# Patient Record
Sex: FEMALE | Race: WHITE | NOT HISPANIC OR LATINO
[De-identification: names, ages, dates, MRNs, and addresses within clinical notes are randomized per-mention and may not be internally consistent; named-entity substitution may affect disease eponyms.]

---

## 2017-02-21 ENCOUNTER — RX RENEWAL (OUTPATIENT)
Age: 82
End: 2017-02-21

## 2017-03-15 ENCOUNTER — APPOINTMENT (OUTPATIENT)
Dept: CARDIOLOGY | Facility: CLINIC | Age: 82
End: 2017-03-15

## 2017-03-22 ENCOUNTER — MEDICATION RENEWAL (OUTPATIENT)
Age: 82
End: 2017-03-22

## 2017-04-19 ENCOUNTER — APPOINTMENT (OUTPATIENT)
Dept: CARDIOLOGY | Facility: CLINIC | Age: 82
End: 2017-04-19

## 2017-04-19 ENCOUNTER — NON-APPOINTMENT (OUTPATIENT)
Age: 82
End: 2017-04-19

## 2017-04-19 VITALS
WEIGHT: 162 LBS | OXYGEN SATURATION: 95 % | HEIGHT: 62 IN | DIASTOLIC BLOOD PRESSURE: 76 MMHG | BODY MASS INDEX: 29.81 KG/M2 | SYSTOLIC BLOOD PRESSURE: 127 MMHG | HEART RATE: 73 BPM

## 2017-05-23 ENCOUNTER — APPOINTMENT (OUTPATIENT)
Dept: CARDIOLOGY | Facility: CLINIC | Age: 82
End: 2017-05-23

## 2017-05-31 ENCOUNTER — APPOINTMENT (OUTPATIENT)
Dept: CARDIOLOGY | Facility: CLINIC | Age: 82
End: 2017-05-31

## 2017-12-13 ENCOUNTER — MEDICATION RENEWAL (OUTPATIENT)
Age: 82
End: 2017-12-13

## 2018-03-21 ENCOUNTER — RX RENEWAL (OUTPATIENT)
Age: 83
End: 2018-03-21

## 2018-03-28 ENCOUNTER — MEDICATION RENEWAL (OUTPATIENT)
Age: 83
End: 2018-03-28

## 2019-01-15 ENCOUNTER — RX RENEWAL (OUTPATIENT)
Age: 84
End: 2019-01-15

## 2019-01-19 ENCOUNTER — RX RENEWAL (OUTPATIENT)
Age: 84
End: 2019-01-19

## 2019-03-20 ENCOUNTER — APPOINTMENT (OUTPATIENT)
Dept: CARDIOLOGY | Facility: CLINIC | Age: 84
End: 2019-03-20
Payer: MEDICARE

## 2019-03-20 ENCOUNTER — NON-APPOINTMENT (OUTPATIENT)
Age: 84
End: 2019-03-20

## 2019-03-20 VITALS
SYSTOLIC BLOOD PRESSURE: 154 MMHG | DIASTOLIC BLOOD PRESSURE: 83 MMHG | WEIGHT: 160 LBS | BODY MASS INDEX: 29.44 KG/M2 | HEIGHT: 62 IN | OXYGEN SATURATION: 97 % | HEART RATE: 90 BPM

## 2019-03-20 DIAGNOSIS — I65.23 OCCLUSION AND STENOSIS OF BILATERAL CAROTID ARTERIES: ICD-10-CM

## 2019-03-20 DIAGNOSIS — I35.1 NONRHEUMATIC AORTIC (VALVE) INSUFFICIENCY: ICD-10-CM

## 2019-03-20 PROCEDURE — 93000 ELECTROCARDIOGRAM COMPLETE: CPT

## 2019-03-20 PROCEDURE — 99215 OFFICE O/P EST HI 40 MIN: CPT

## 2019-03-20 RX ORDER — AMLODIPINE BESYLATE 2.5 MG/1
2.5 TABLET ORAL DAILY
Qty: 90 | Refills: 1 | Status: ACTIVE | COMMUNITY

## 2019-03-20 NOTE — DISCUSSION/SUMMARY
[FreeTextEntry1] : Mrs. Jamil has been doing well from a cardiac symptomatic standpoint since her previous visit here on 4/19/17. Specifically, she does not describe having experienced any signs or symptoms to suggest the development of an anginal syndrome, congestive heart failure, or a hemodynamically-compromising arrhythmia. Her cardiac examination today is remarkable for a soft systolic ejection murmur, in all likelihood representing age-related valvular calcification, and essentially unchanged from her previous visit with me. Her blood pressure reading is normal today. Her electrocardiogram today reveals sinus rhythm with non-specific poor R wave progression in leads V1-V4, essentially unchanged from her previous office tracing.\par \par As the patient's blood pressure readings at the office of her hematologist when she goes for her weekly Aranesp injections have been running in the normal range, as well as the patient exhibiting a normal blood pressure reading today, I have instructed her to continue on the present dosage of Norvasc for the time being. She was previously being treated with losartan, however, this medication was discontinued by her primary care provider, secondary to the development of hyperkalemia.\par \par The patient anticipates having follow-up blood testing performed through the office of her internist in July of 2019, and I have asked her to have a copy of the report forwarded to my office for my review and records, so that I may assess the efficacy of the present dosage of Zocor in optimizing the lipid profile, as well as to check for any potential adverse effects on the liver.\par \par I am referring the patient for a follow-up echocardiography at this point to reassess the degree of mitral regurgitation and the degree of aortic regurgitation. Follow-up carotid artery Doppler testing will be performed to reassess the degree of atherosclerosis formation demonstrated on the previous study. In view of the finding of significant coronary artery calcifications on the chest CT scan 3/5/19, I am referring the patient for exercise stress testing to evaluate for the presence of ischemic heart disease. The patient is going to make arrangements to have these studies performed through our office, and I will telephone her to discuss the findings, once the studies have been completed.\par \par I have asked the patient to call me if she should have any questions or problems pertaining to these matters, and especially if she should experience any concerning symptoms. I have otherwise asked her to return to the office for follow-up cardiac evaluation in 6 months, provided she remains clinically stable in the interim, and the findings on the aforementioned cardiovascular studies do not warrant sooner evaluation and/or treatment.

## 2019-03-20 NOTE — PHYSICAL EXAM
[General Appearance - In No Acute Distress] : no acute distress [Normal Conjunctiva] : the conjunctiva exhibited no abnormalities [No Oral Pallor] : no oral pallor [Respiration, Rhythm And Depth] : normal respiratory rhythm and effort [Auscultation Breath Sounds / Voice Sounds] : lungs were clear to auscultation bilaterally [Bowel Sounds] : normal bowel sounds [Abdomen Tenderness] : non-tender [Abnormal Walk] : normal gait [Cyanosis, Localized] : no localized cyanosis [Oriented To Time, Place, And Person] : oriented to person, place, and time [] : no rash [Not Palpable] : not palpable [No Precordial Heave] : no precordial heave was noted [Normal Rate] : normal [Normal S1] : normal S1 [Rhythm Regular] : regular [No Gallop] : no gallop heard [Normal S2] : normal S2 [Carotids] : the murmur was transmitted to the carotid arteries [I] : a grade 1 [2+] : left 2+ [No Abnormalities] : the abdominal aorta was not enlarged and no bruit was heard [No Pitting Edema] : no pitting edema present [Apical Thrill] : no thrill palpable at the apex [FreeTextEntry1] : no significant JVD is appreciated at a 45° angle [Click] : no click [Right Carotid Bruit] : no bruit heard over the right carotid [Pericardial Rub] : no pericardial rub [Left Carotid Bruit] : no bruit heard over the left carotid

## 2019-03-20 NOTE — HISTORY OF PRESENT ILLNESS
[FreeTextEntry1] : Mrs. Abbie Jamil presented to the office today for follow-up cardiac evaluation.\par \par The patient is an 83-year-old female with a history of hypertension, a dyslipidemia, mild mitral regurgitation, mild aortic regurgitation, coronary artery calcifications (incidentally discovered on a chest CT scan), mild carotid atherosclerosis, renal insufficiency, chronic anemia, a thyroid nodule, a pulmonary nodule, a pancreatic cyst, breast carcinoma, and vertigo.\par \par The patient has been doing well from a cardiac symptomatic standpoint since her previous visit here on 4/19/17. Specifically, she does not describe having experienced chest discomfort or dyspnea on exertion, in association with her activities. She has not noted orthopnea, paroxysmal nocturnal dyspnea, or lower extremity edema. She has not experienced any episodes of palpitations, presyncope or syncope.\par \par The patient reports having been having her blood pressure readings checked on a weekly basis when she presents to the office of her hematologist, Dr. Roma Marcial, for Aranesp injections, and describes the readings as having been consistently running well within the normal range.\par \par Review of systems is significant for the patient having undergone multiple biopsies of a thyroid nodule, performed by the patient's endocrinologist, Dr. Nicole Allison. In addition, she states that  she has been following with her hematologist/oncologist regarding having high iron stores, although she states that she has not been definitively diagnosed as having hemachromatosis.\par \par As far as risk factors for coronary artery disease are concerned, the patient has a history of hypertension and a dyslipidemia. She denies a history of diabetes. She discontinued cigarette smoking in 1986, having previously smoked an average of 1-1/2 to 2 packs per day for a period of approximately 30 years. She describes having a family history of premature coronary artery disease in her father, stating that he suffered his first "heart attack" while in his 50's (ultimately passing at the age of 60 secondary to stomach carcinoma).\par \par Past medical/surgical history is significant for left breast carcinoma having been diagnosed in 2009, treated with lumpectomy, followed by a 35 week course of radiation therapy, followed by a 5 year course of tamoxifen (she has been undergoing surveillance by her oncologist, Dr. Roma Marcial on a regular basis since that time), chronic anemia of undetermined etiology (she has been receiving Aranesp injections fthrough the office of Dr. Marcial, presently every week), a pulmonary nodule (for which the patient follows regularly with a pulmonologist, including having periodic surveillance CT scanning of the chest performed), a pancreatic cyst (incidentally discovered on a chest CT scan, and deemed to be stable, according to the patient), and vertigo.\par \par Echocardiography performed most recently on 5/31/17 revealed the left atrium to be moderately dilated. The remainder of the cardiac chambers were normal in dimension. Left ventricular wall thickness and wall motion were normal, with a calculated ejection fraction of 63%. Mild aortic regurgitation, mild mitral regurgitation, and mild tricuspid regurgitation were demonstrated, without evidence of pulmonary hypertension.\par \par Carotid artery Doppler testing performed most recently on 5/23/17 revealed mild plaque formation involving the bulbar regions bilaterally and mild to moderate plaque formation involving the proximal portions of the internal carotid arteries bilaterally. No significant stenoses were demonstrated. The internal carotid arteries were noted to be tortuous. The left thyroid gland was enlarged with a 2.2 cm mass, which is noted to be of similar echogenicity to the thyroid gland.\par \par Laboratory studies (non-fasting) performed through the office of the patient's endocrinologist on 1/31/17 revealed cholesterol 217, triglycerides 253, HDL 44, and direct . The thyroid stimulating hormone level was 2.25. The liver chemistries were normal. The BUN and creatinine were 51 and 1.5, respectively. The potassium level was 5.2.

## 2019-03-20 NOTE — REASON FOR VISIT
[Hyperlipidemia] : hyperlipidemia [Hypertension] : hypertension [FreeTextEntry1] : coronary artery calcifications

## 2019-03-26 ENCOUNTER — EMERGENCY (EMERGENCY)
Facility: HOSPITAL | Age: 84
LOS: 1 days | Discharge: HOME | End: 2019-03-26
Attending: EMERGENCY MEDICINE | Admitting: EMERGENCY MEDICINE
Payer: MEDICARE

## 2019-03-26 VITALS
WEIGHT: 160.94 LBS | RESPIRATION RATE: 18 BRPM | OXYGEN SATURATION: 97 % | HEIGHT: 62 IN | HEART RATE: 116 BPM | SYSTOLIC BLOOD PRESSURE: 169 MMHG | DIASTOLIC BLOOD PRESSURE: 63 MMHG | TEMPERATURE: 98 F

## 2019-03-26 VITALS
DIASTOLIC BLOOD PRESSURE: 54 MMHG | RESPIRATION RATE: 16 BRPM | TEMPERATURE: 98 F | SYSTOLIC BLOOD PRESSURE: 167 MMHG | HEART RATE: 100 BPM | OXYGEN SATURATION: 95 %

## 2019-03-26 LAB
ABO RH CONFIRMATION: SIGNIFICANT CHANGE UP
ALBUMIN SERPL ELPH-MCNC: 3.6 G/DL — SIGNIFICANT CHANGE UP (ref 3.3–5)
ALP SERPL-CCNC: 73 U/L — SIGNIFICANT CHANGE UP (ref 30–120)
ALT FLD-CCNC: 29 U/L DA — SIGNIFICANT CHANGE UP (ref 10–60)
ANION GAP SERPL CALC-SCNC: 13 MMOL/L — SIGNIFICANT CHANGE UP (ref 5–17)
APTT BLD: 30.2 SEC — SIGNIFICANT CHANGE UP (ref 28.5–37)
AST SERPL-CCNC: 21 U/L — SIGNIFICANT CHANGE UP (ref 10–40)
BASOPHILS # BLD AUTO: 0.03 K/UL — SIGNIFICANT CHANGE UP (ref 0–0.2)
BASOPHILS NFR BLD AUTO: 0.4 % — SIGNIFICANT CHANGE UP (ref 0–2)
BILIRUB SERPL-MCNC: 1.2 MG/DL — SIGNIFICANT CHANGE UP (ref 0.2–1.2)
BLD GP AB SCN SERPL QL: SIGNIFICANT CHANGE UP
BUN SERPL-MCNC: 48 MG/DL — HIGH (ref 7–23)
CALCIUM SERPL-MCNC: 9.6 MG/DL — SIGNIFICANT CHANGE UP (ref 8.4–10.5)
CHLORIDE SERPL-SCNC: 112 MMOL/L — HIGH (ref 96–108)
CO2 SERPL-SCNC: 22 MMOL/L — SIGNIFICANT CHANGE UP (ref 22–31)
CREAT SERPL-MCNC: 1.52 MG/DL — HIGH (ref 0.5–1.3)
EOSINOPHIL # BLD AUTO: 0.08 K/UL — SIGNIFICANT CHANGE UP (ref 0–0.5)
EOSINOPHIL NFR BLD AUTO: 1 % — SIGNIFICANT CHANGE UP (ref 0–6)
GLUCOSE SERPL-MCNC: 120 MG/DL — HIGH (ref 70–99)
HCT VFR BLD CALC: 23.6 % — LOW (ref 34.5–45)
HGB BLD-MCNC: 6.9 G/DL — CRITICAL LOW (ref 11.5–15.5)
IMM GRANULOCYTES NFR BLD AUTO: 0.4 % — SIGNIFICANT CHANGE UP (ref 0–1.5)
INR BLD: 1.12 RATIO — SIGNIFICANT CHANGE UP (ref 0.88–1.16)
LYMPHOCYTES # BLD AUTO: 1.45 K/UL — SIGNIFICANT CHANGE UP (ref 1–3.3)
LYMPHOCYTES # BLD AUTO: 18.4 % — SIGNIFICANT CHANGE UP (ref 13–44)
MCHC RBC-ENTMCNC: 23.5 PG — LOW (ref 27–34)
MCHC RBC-ENTMCNC: 29.2 GM/DL — LOW (ref 32–36)
MCV RBC AUTO: 80.3 FL — SIGNIFICANT CHANGE UP (ref 80–100)
MONOCYTES # BLD AUTO: 0.76 K/UL — SIGNIFICANT CHANGE UP (ref 0–0.9)
MONOCYTES NFR BLD AUTO: 9.7 % — SIGNIFICANT CHANGE UP (ref 2–14)
NEUTROPHILS # BLD AUTO: 5.52 K/UL — SIGNIFICANT CHANGE UP (ref 1.8–7.4)
NEUTROPHILS NFR BLD AUTO: 70.1 % — SIGNIFICANT CHANGE UP (ref 43–77)
NRBC # BLD: 0 /100 WBCS — SIGNIFICANT CHANGE UP (ref 0–0)
PLATELET # BLD AUTO: 215 K/UL — SIGNIFICANT CHANGE UP (ref 150–400)
POTASSIUM SERPL-MCNC: 4.7 MMOL/L — SIGNIFICANT CHANGE UP (ref 3.5–5.3)
POTASSIUM SERPL-SCNC: 4.7 MMOL/L — SIGNIFICANT CHANGE UP (ref 3.5–5.3)
PROT SERPL-MCNC: 7.1 G/DL — SIGNIFICANT CHANGE UP (ref 6–8.3)
PROTHROM AB SERPL-ACNC: 12.3 SEC — SIGNIFICANT CHANGE UP (ref 10–12.9)
RBC # BLD: 2.94 M/UL — LOW (ref 3.8–5.2)
RBC # FLD: 30.8 % — HIGH (ref 10.3–14.5)
SODIUM SERPL-SCNC: 147 MMOL/L — HIGH (ref 135–145)
WBC # BLD: 7.87 K/UL — SIGNIFICANT CHANGE UP (ref 3.8–10.5)
WBC # FLD AUTO: 7.87 K/UL — SIGNIFICANT CHANGE UP (ref 3.8–10.5)

## 2019-03-26 PROCEDURE — 80053 COMPREHEN METABOLIC PANEL: CPT

## 2019-03-26 PROCEDURE — 85610 PROTHROMBIN TIME: CPT

## 2019-03-26 PROCEDURE — 85027 COMPLETE CBC AUTOMATED: CPT

## 2019-03-26 PROCEDURE — 86900 BLOOD TYPING SEROLOGIC ABO: CPT

## 2019-03-26 PROCEDURE — 99284 EMERGENCY DEPT VISIT MOD MDM: CPT

## 2019-03-26 PROCEDURE — 85730 THROMBOPLASTIN TIME PARTIAL: CPT

## 2019-03-26 PROCEDURE — 36415 COLL VENOUS BLD VENIPUNCTURE: CPT

## 2019-03-26 PROCEDURE — P9016: CPT

## 2019-03-26 PROCEDURE — 99285 EMERGENCY DEPT VISIT HI MDM: CPT | Mod: 25

## 2019-03-26 PROCEDURE — 86923 COMPATIBILITY TEST ELECTRIC: CPT

## 2019-03-26 PROCEDURE — 86901 BLOOD TYPING SEROLOGIC RH(D): CPT

## 2019-03-26 PROCEDURE — 86850 RBC ANTIBODY SCREEN: CPT

## 2019-03-26 PROCEDURE — 36430 TRANSFUSION BLD/BLD COMPNT: CPT

## 2019-03-26 NOTE — ED PROVIDER NOTE - OBJECTIVE STATEMENT
Pt is an 82 y/o F, with PMHx of anemia, breast CA, MDS, and HLD, presenting to the ED with abnormal lab results. Pt states she was receiving routine injections, had a CBC today showing her Hgb was 6.9, and pt sent to the ED for further management. Pt states she has been intemrittently SOB. Denies fever, CP, abd pain, N/V, melena, BRBPR, hematemesis, HA, and dizziness. Has never required transfusions. Pt believes her last Hgb prior to today was 7+.   Alyssa- PMMICHELLE Marcial- Hematology Pt is an 82 y/o F, with PMHx of anemia, breast CA, MDS, and HLD, presenting to the ED with abnormal lab results. Pt states she was receiving routine injections, had a CBC today showing her Hgb was 6.9, and pt sent to the ED for 2 units PRBC's. Pt states she has been intemrittently SOB. Denies fever, CP, abd pain, N/V, melena, BRBPR, hematemesis, HA, and dizziness. Has never required transfusions. Pt believes her last Hgb prior to today was 7+.   Alyssa- PMMICHELLE Marcial- Hematology

## 2019-03-26 NOTE — ED ADULT NURSE REASSESSMENT NOTE - NS ED NURSE REASSESS COMMENT FT1
received report and assumed care of pt at change of shift. pt awake and alert. denies pain/discomfort. family at bedside. second unit PRBC's unit #[id number] infusing at this time. no transfusion reaction seen. pt taking and tolerating PO food and fluids. will continue to monitor. cm+sinus

## 2019-03-26 NOTE — ED PROVIDER NOTE - CLINICAL SUMMARY MEDICAL DECISION MAKING FREE TEXT BOX
Pt with chronic anemia secondary to MDS, referred  by her hematologist for two units PRBC for hgb 6.9.

## 2019-03-26 NOTE — ED ADULT NURSE NOTE - ED STAT RN HANDOFF DETAILS
Awaiting blood availability. IV patent. Consent obtained for transfusion.  Comfortable appearance. VSS. Endorsed to Timothy Lui RN

## 2019-03-26 NOTE — ED ADULT NURSE NOTE - OBJECTIVE STATEMENT
Sent by her Hem/onc Dr. Marcial to receive blood transfusions, Sent by her Hem/onc Dr. Marcial to receive blood transfusions, Patient has had anemia for "a while" but states her most recent count was very low at 6.9. Patient also states dyspnea on exertion and recent airplane flight.

## 2019-03-26 NOTE — ED ADULT NURSE REASSESSMENT NOTE - NS ED NURSE REASSESS COMMENT FT1
blood transfusion completed. pt OOB to BRP. NAD presently. see transfusion administration record for vs. d/c to family. cm=ST

## 2019-03-28 ENCOUNTER — APPOINTMENT (OUTPATIENT)
Dept: CARDIOLOGY | Facility: CLINIC | Age: 84
End: 2019-03-28
Payer: MEDICARE

## 2019-04-03 ENCOUNTER — APPOINTMENT (OUTPATIENT)
Dept: CARDIOLOGY | Facility: CLINIC | Age: 84
End: 2019-04-03
Payer: MEDICARE

## 2019-04-03 PROCEDURE — 93306 TTE W/DOPPLER COMPLETE: CPT

## 2019-04-04 ENCOUNTER — APPOINTMENT (OUTPATIENT)
Dept: CARDIOLOGY | Facility: CLINIC | Age: 84
End: 2019-04-04
Payer: MEDICARE

## 2019-04-04 PROCEDURE — 93880 EXTRACRANIAL BILAT STUDY: CPT

## 2019-04-17 PROBLEM — D64.9 ANEMIA, UNSPECIFIED: Chronic | Status: ACTIVE | Noted: 2019-03-26

## 2019-04-23 ENCOUNTER — OTHER (OUTPATIENT)
Age: 84
End: 2019-04-23

## 2019-04-23 ENCOUNTER — APPOINTMENT (OUTPATIENT)
Dept: SURGERY | Facility: CLINIC | Age: 84
End: 2019-04-23
Payer: MEDICARE

## 2019-04-23 VITALS
BODY MASS INDEX: 27.6 KG/M2 | WEIGHT: 150 LBS | DIASTOLIC BLOOD PRESSURE: 71 MMHG | HEIGHT: 62 IN | SYSTOLIC BLOOD PRESSURE: 117 MMHG | HEART RATE: 71 BPM

## 2019-04-23 PROCEDURE — 36415 COLL VENOUS BLD VENIPUNCTURE: CPT

## 2019-04-23 PROCEDURE — 99204 OFFICE O/P NEW MOD 45 MIN: CPT

## 2019-04-24 LAB
T3 SERPL-MCNC: 182 NG/DL
T4 FREE SERPL-MCNC: 2.5 NG/DL
TSH SERPL-ACNC: 0.01 UIU/ML

## 2019-04-25 ENCOUNTER — APPOINTMENT (OUTPATIENT)
Dept: CARDIOLOGY | Facility: CLINIC | Age: 84
End: 2019-04-25

## 2019-04-25 LAB
THYROGLOB AB SERPL-ACNC: <20 IU/ML
THYROPEROXIDASE AB SERPL IA-ACNC: 88.9 IU/ML

## 2019-04-26 NOTE — REASON FOR VISIT
[Initial Consultation] : an initial consultation for [Spouse] : spouse [FreeTextEntry2] : toxic goiter [Family Member] : family member

## 2019-04-26 NOTE — HISTORY OF PRESENT ILLNESS
[de-identified] : history of toxic goiter with tremor, 15 lb weight loss and shortness of breath. denies dysphagia, hoarseness or RT exposure. scan shows toxic left nodule.  sonogram shows 2.5 cm left thyroid nodule. FNA: thyroiditis.  suppressed TSH

## 2019-04-26 NOTE — PHYSICAL EXAM
[de-identified] : substernal left thyroid nodule [Laryngoscopy Performed] : laryngoscopy was performed, see procedure section for findings [Midline] : located in midline position [Normal] : orientation to person, place, and time: normal [FreeTextEntry1] : resting pulse 64 [de-identified] : indirect  laryngoscopy shows normal vocal cord mobility bilaterally with no lesions noted

## 2019-04-26 NOTE — CONSULT LETTER
[Consult Letter:] : I had the pleasure of evaluating your patient, [unfilled]. [Dear  ___] : Dear  [unfilled], [Please see my note below.] : Please see my note below. [Consult Closing:] : Thank you very much for allowing me to participate in the care of this patient.  If you have any questions, please do not hesitate to contact me. [Sincerely,] : Sincerely, [FreeTextEntry2] : Dr. Nicole Allison, Dr. Ge Hayes [FreeTextEntry3] : Kash Melendez MD, FACS\par System Director, Endocrine Surgery\par [facility]\par  [DrQiana  ___] : Dr. MARES

## 2019-04-26 NOTE — ASSESSMENT
[FreeTextEntry1] : discussed options for management including antithyroid medications vs SCOTT vs thyroid lobectomy. risks, benefits and alternatives discussed at length. wishes to proceed with surgery. to be scheduled at [facility].  bloods drawn. to call next week for results. d/w dr Allison who concurs with performing thyroid lobectomy

## 2019-04-30 ENCOUNTER — EMERGENCY (EMERGENCY)
Facility: HOSPITAL | Age: 84
LOS: 1 days | Discharge: HOME | End: 2019-04-30
Attending: EMERGENCY MEDICINE | Admitting: EMERGENCY MEDICINE
Payer: MEDICARE

## 2019-04-30 VITALS
TEMPERATURE: 98 F | SYSTOLIC BLOOD PRESSURE: 151 MMHG | OXYGEN SATURATION: 97 % | DIASTOLIC BLOOD PRESSURE: 66 MMHG | WEIGHT: 149.91 LBS | HEART RATE: 70 BPM | RESPIRATION RATE: 18 BRPM | HEIGHT: 62 IN

## 2019-04-30 LAB
ALBUMIN SERPL ELPH-MCNC: 3.6 G/DL — SIGNIFICANT CHANGE UP (ref 3.3–5)
ALP SERPL-CCNC: 61 U/L — SIGNIFICANT CHANGE UP (ref 30–120)
ALT FLD-CCNC: 25 U/L DA — SIGNIFICANT CHANGE UP (ref 10–60)
ANION GAP SERPL CALC-SCNC: 13 MMOL/L — SIGNIFICANT CHANGE UP (ref 5–17)
AST SERPL-CCNC: 18 U/L — SIGNIFICANT CHANGE UP (ref 10–40)
BILIRUB SERPL-MCNC: 0.6 MG/DL — SIGNIFICANT CHANGE UP (ref 0.2–1.2)
BUN SERPL-MCNC: 66 MG/DL — HIGH (ref 7–23)
CALCIUM SERPL-MCNC: 9.8 MG/DL — SIGNIFICANT CHANGE UP (ref 8.4–10.5)
CHLORIDE SERPL-SCNC: 112 MMOL/L — HIGH (ref 96–108)
CO2 SERPL-SCNC: 19 MMOL/L — LOW (ref 22–31)
CREAT SERPL-MCNC: 1.83 MG/DL — HIGH (ref 0.5–1.3)
GLUCOSE SERPL-MCNC: 119 MG/DL — HIGH (ref 70–99)
HCT VFR BLD CALC: 22.7 % — LOW (ref 34.5–45)
HGB BLD-MCNC: 7 G/DL — CRITICAL LOW (ref 11.5–15.5)
MCHC RBC-ENTMCNC: 27.1 PG — SIGNIFICANT CHANGE UP (ref 27–34)
MCHC RBC-ENTMCNC: 30.8 GM/DL — LOW (ref 32–36)
MCV RBC AUTO: 88 FL — SIGNIFICANT CHANGE UP (ref 80–100)
NRBC # BLD: 0 /100 WBCS — SIGNIFICANT CHANGE UP (ref 0–0)
PLATELET # BLD AUTO: 191 K/UL — SIGNIFICANT CHANGE UP (ref 150–400)
POTASSIUM SERPL-MCNC: 4.6 MMOL/L — SIGNIFICANT CHANGE UP (ref 3.5–5.3)
POTASSIUM SERPL-SCNC: 4.6 MMOL/L — SIGNIFICANT CHANGE UP (ref 3.5–5.3)
PROT SERPL-MCNC: 6.6 G/DL — SIGNIFICANT CHANGE UP (ref 6–8.3)
RBC # BLD: 2.58 M/UL — LOW (ref 3.8–5.2)
RBC # FLD: 26.7 % — HIGH (ref 10.3–14.5)
SODIUM SERPL-SCNC: 144 MMOL/L — SIGNIFICANT CHANGE UP (ref 135–145)
WBC # BLD: 6.56 K/UL — SIGNIFICANT CHANGE UP (ref 3.8–10.5)
WBC # FLD AUTO: 6.56 K/UL — SIGNIFICANT CHANGE UP (ref 3.8–10.5)

## 2019-04-30 PROCEDURE — 96361 HYDRATE IV INFUSION ADD-ON: CPT

## 2019-04-30 PROCEDURE — P9016: CPT

## 2019-04-30 PROCEDURE — 86900 BLOOD TYPING SEROLOGIC ABO: CPT

## 2019-04-30 PROCEDURE — 99285 EMERGENCY DEPT VISIT HI MDM: CPT | Mod: 25

## 2019-04-30 PROCEDURE — 36430 TRANSFUSION BLD/BLD COMPNT: CPT

## 2019-04-30 PROCEDURE — 86901 BLOOD TYPING SEROLOGIC RH(D): CPT

## 2019-04-30 PROCEDURE — 99285 EMERGENCY DEPT VISIT HI MDM: CPT

## 2019-04-30 PROCEDURE — 80053 COMPREHEN METABOLIC PANEL: CPT

## 2019-04-30 PROCEDURE — 85027 COMPLETE CBC AUTOMATED: CPT

## 2019-04-30 PROCEDURE — 86850 RBC ANTIBODY SCREEN: CPT

## 2019-04-30 PROCEDURE — 86923 COMPATIBILITY TEST ELECTRIC: CPT

## 2019-04-30 PROCEDURE — 36415 COLL VENOUS BLD VENIPUNCTURE: CPT

## 2019-04-30 PROCEDURE — 96360 HYDRATION IV INFUSION INIT: CPT

## 2019-04-30 RX ORDER — SODIUM CHLORIDE 9 MG/ML
1000 INJECTION INTRAMUSCULAR; INTRAVENOUS; SUBCUTANEOUS ONCE
Qty: 0 | Refills: 0 | Status: COMPLETED | OUTPATIENT
Start: 2019-04-30 | End: 2019-04-30

## 2019-04-30 RX ADMIN — SODIUM CHLORIDE 333.33 MILLILITER(S): 9 INJECTION INTRAMUSCULAR; INTRAVENOUS; SUBCUTANEOUS at 21:30

## 2019-04-30 NOTE — ED ADULT NURSE REASSESSMENT NOTE - NS ED NURSE REASSESS COMMENT FT1
RBC's infusing, pt is awake and alert, denies any symptoms of a reaction at this time. Pt ate a turkey sandwich and is drinking water. Daughter remains at bedside with the pt.

## 2019-04-30 NOTE — ED PROVIDER NOTE - ATTENDING CONTRIBUTION TO CARE
hx as per pt and PA, 84yo female who presents for transfusion today. pt had blood work and was told her Hb is 7, no sob, no dizziness, no other compalints  exam:+pale conjunctiva, normal lungs, abd soft, neuro intact  plan: labs, transfuse and d/c home  agree with assessment and plan of PA

## 2019-04-30 NOTE — ED PROVIDER NOTE - CLINICAL SUMMARY MEDICAL DECISION MAKING FREE TEXT BOX
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

## 2019-04-30 NOTE — ED ADULT NURSE NOTE - OBJECTIVE STATEMENT
Pt states she received a call from her oncologist after having labs drawn that she was anemic and required a blood transfusion. Pt is ambulatory, gait is strong and steady. Pt denies any dizziness, weakness, or lightheadedness. Pt denies any obvious signs of bleeding, states she is usually anemic and receives injections but missed her scheduled dose in March.

## 2019-04-30 NOTE — ED PROVIDER NOTE - OBJECTIVE STATEMENT
84 y/o F with hx of sideroblastic anemia, MDS, HLD, breast ca here for blood transfusion today. Pt states that she is scheduled for L thyroidectomy on 5/13 by Dr. Melendez, had blood work 84 y/o F with hx of sideroblastic anemia, MDS, HLD, breast ca here for blood transfusion today. Pt states that she is scheduled for L thyroidectomy on 5/13 by Dr. Melendez, had blood work done today which showed Hgb/hct of 7.1/23.3 and sent to ED for transfusion and then to be discharged. States that she gets weeks shots of aranesp. Pt denies any symptoms at this time. Denies dizziness, rectal bleeding, SOB or other symptoms.   PMD: Dr. Shah 82 y/o F with hx of sideroblastic anemia, MDS, HLD, breast ca here for blood transfusion today. Pt states that she is scheduled for L thyroidectomy on 5/13 by Dr. Melendez, had blood work done today which showed Hgb/hct of 7.1/23.3 and sent to ED for transfusion and then to be discharged. States that she gets weeks shots of aranesp. States that she has had a poor appetite and not eating/drinking much lately. Pt denies any symptoms at this time. Denies dizziness, rectal bleeding, SOB or other symptoms.   PMD: Dr. Shah

## 2019-04-30 NOTE — ED PROVIDER NOTE - CONSTITUTIONAL, MLM
normal... appears slightly pale, well nourished, awake, alert, oriented to person, place, time/situation and in no apparent distress.

## 2019-05-01 VITALS
HEART RATE: 67 BPM | SYSTOLIC BLOOD PRESSURE: 141 MMHG | DIASTOLIC BLOOD PRESSURE: 46 MMHG | RESPIRATION RATE: 18 BRPM | OXYGEN SATURATION: 98 % | TEMPERATURE: 98 F

## 2019-05-01 RX ADMIN — SODIUM CHLORIDE 1000 MILLILITER(S): 9 INJECTION INTRAMUSCULAR; INTRAVENOUS; SUBCUTANEOUS at 00:30

## 2019-05-01 NOTE — ED ADULT NURSE REASSESSMENT NOTE - NS ED NURSE REASSESS COMMENT FT1
PRBC completed, pt feels well, appears more energetic. Pt ambulated to the BR, gait is steady. Pt declines repeat lab draw stating she will see her endocrinologist in the AM and go by her recommendation.

## 2019-05-02 ENCOUNTER — RESULT REVIEW (OUTPATIENT)
Age: 84
End: 2019-05-02

## 2019-05-02 ENCOUNTER — APPOINTMENT (OUTPATIENT)
Dept: CARDIOLOGY | Facility: CLINIC | Age: 84
End: 2019-05-02
Payer: MEDICARE

## 2019-05-02 ENCOUNTER — OTHER (OUTPATIENT)
Age: 84
End: 2019-05-02

## 2019-05-02 PROCEDURE — 78452 HT MUSCLE IMAGE SPECT MULT: CPT

## 2019-05-02 PROCEDURE — 93015 CV STRESS TEST SUPVJ I&R: CPT

## 2019-05-02 PROCEDURE — A9500: CPT

## 2019-05-03 ENCOUNTER — OUTPATIENT (OUTPATIENT)
Dept: OUTPATIENT SERVICES | Facility: HOSPITAL | Age: 84
LOS: 1 days | End: 2019-05-03
Payer: MEDICARE

## 2019-05-03 VITALS
HEART RATE: 63 BPM | WEIGHT: 151.02 LBS | RESPIRATION RATE: 16 BRPM | HEIGHT: 63 IN | DIASTOLIC BLOOD PRESSURE: 56 MMHG | SYSTOLIC BLOOD PRESSURE: 146 MMHG | TEMPERATURE: 98 F

## 2019-05-03 DIAGNOSIS — E04.1 NONTOXIC SINGLE THYROID NODULE: ICD-10-CM

## 2019-05-03 DIAGNOSIS — T78.40XA ALLERGY, UNSPECIFIED, INITIAL ENCOUNTER: ICD-10-CM

## 2019-05-03 DIAGNOSIS — D64.9 ANEMIA, UNSPECIFIED: ICD-10-CM

## 2019-05-03 DIAGNOSIS — C50.919 MALIGNANT NEOPLASM OF UNSPECIFIED SITE OF UNSPECIFIED FEMALE BREAST: ICD-10-CM

## 2019-05-03 LAB
ANION GAP SERPL CALC-SCNC: 11 MMO/L — SIGNIFICANT CHANGE UP (ref 7–14)
BUN SERPL-MCNC: 41 MG/DL — HIGH (ref 7–23)
CALCIUM SERPL-MCNC: 10 MG/DL — SIGNIFICANT CHANGE UP (ref 8.4–10.5)
CHLORIDE SERPL-SCNC: 114 MMOL/L — HIGH (ref 98–107)
CO2 SERPL-SCNC: 17 MMOL/L — LOW (ref 22–31)
CREAT SERPL-MCNC: 1.37 MG/DL — HIGH (ref 0.5–1.3)
GLUCOSE SERPL-MCNC: 86 MG/DL — SIGNIFICANT CHANGE UP (ref 70–99)
HCT VFR BLD CALC: 25.7 % — LOW (ref 34.5–45)
HGB BLD-MCNC: 7.7 G/DL — LOW (ref 11.5–15.5)
MCHC RBC-ENTMCNC: 27.3 PG — SIGNIFICANT CHANGE UP (ref 27–34)
MCHC RBC-ENTMCNC: 30 % — LOW (ref 32–36)
MCV RBC AUTO: 91.1 FL — SIGNIFICANT CHANGE UP (ref 80–100)
NRBC # FLD: 0 K/UL — SIGNIFICANT CHANGE UP (ref 0–0)
PLATELET # BLD AUTO: 187 K/UL — SIGNIFICANT CHANGE UP (ref 150–400)
PMV BLD: SIGNIFICANT CHANGE UP FL (ref 7–13)
POTASSIUM SERPL-MCNC: 4.7 MMOL/L — SIGNIFICANT CHANGE UP (ref 3.5–5.3)
POTASSIUM SERPL-SCNC: 4.7 MMOL/L — SIGNIFICANT CHANGE UP (ref 3.5–5.3)
RBC # BLD: 2.82 M/UL — LOW (ref 3.8–5.2)
RBC # FLD: 24.9 % — HIGH (ref 10.3–14.5)
SODIUM SERPL-SCNC: 142 MMOL/L — SIGNIFICANT CHANGE UP (ref 135–145)
T3 SERPL-MCNC: 189.1 NG/DL — SIGNIFICANT CHANGE UP (ref 80–200)
T4 FREE SERPL-MCNC: 2.45 NG/DL — HIGH (ref 0.9–1.8)
TSH SERPL-MCNC: < 0.1 UIU/ML — LOW (ref 0.27–4.2)
WBC # BLD: 6.15 K/UL — SIGNIFICANT CHANGE UP (ref 3.8–10.5)
WBC # FLD AUTO: 6.15 K/UL — SIGNIFICANT CHANGE UP (ref 3.8–10.5)

## 2019-05-03 PROCEDURE — 93010 ELECTROCARDIOGRAM REPORT: CPT

## 2019-05-03 RX ORDER — ATENOLOL 25 MG/1
1 TABLET ORAL
Qty: 0 | Refills: 0 | COMMUNITY

## 2019-05-03 RX ORDER — METHIMAZOLE 10 MG/1
1 TABLET ORAL
Qty: 0 | Refills: 0 | COMMUNITY

## 2019-05-03 NOTE — H&P PST ADULT - NSICDXPROBLEM_GEN_ALL_CORE_FT
PROBLEM DIAGNOSES  Problem: Nontoxic thyroid nodule  Assessment and Plan: Left Thyroid Lobectomy Possible Total  Pre op instructions including Hibiclens reviewed with pt ; pt appears to have a good understanding of pre op instructions  Pt to Dr Vick for pre op cardiac clearance ; echo [ 3/19] and ST  [4/19] to be faxed with cardiac clearance     Problem: Anemia  Assessment and Plan: Pt f/u with Dr Marcial every Tuesday ; request most recent consult     Problem: Breast cancer  Assessment and Plan: No Blood, BP, IV Left Arm     Problem: Allergy  Assessment and Plan: PCN, Codeine  OR Booking notified via fax

## 2019-05-03 NOTE — H&P PST ADULT - NSICDXFAMILYHX_GEN_ALL_CORE_FT
FAMILY HISTORY:  FH: breast cancer, mother  FH: colon cancer, brother  FH: coronary artery disease, father, brother

## 2019-05-03 NOTE — H&P PST ADULT - HISTORY OF PRESENT ILLNESS
Pt is an 83 y.o. female ; pt reports h/o hyperthyroidism dx 4 weeks ago ; pt reports h/o left thyroid nodule ; pt f/u Dr Valdez ; pt referred to surgeon ; pt now presents for Left Thyroid Lobectomy possible Total    Pt reports recent 10 lb weight loss, Pt is an 83 y.o. female ;pt reports h/o thyroid nodule ; monitored by endocrinologist ; pt reports h/o hyperthyroidism dx 4 weeks ago ; pt f/u Dr Allison ; pt referred to surgeon ; pt now presents for Left Thyroid Lobectomy possible Total    Pt reports recent 10 lb weight loss,

## 2019-05-03 NOTE — H&P PST ADULT - NSICDXPASTMEDICALHX_GEN_ALL_CORE_FT
PAST MEDICAL HISTORY:  Anemia     Breast Cancer Left Breast  tx surgery , rt and tamoxifen    Gout 2018    H/O hyperthyroidism     Hypercholesterolemia     Hyperlipidemia     MDS (myelodysplastic syndrome)     Vertigo last summer 2018

## 2019-05-03 NOTE — H&P PST ADULT - OTHER CARE PROVIDERS
Dr Vick cardiologist                        Dr Escalona Dr Vick cardiologist               Dr Allison endocrinologist

## 2019-05-03 NOTE — H&P PST ADULT - NSANTHOSAYNRD_GEN_A_CORE
No. NONA screening performed.  STOP BANG Legend: 0-2 = LOW Risk; 3-4 = INTERMEDIATE Risk; 5-8 = HIGH Risk

## 2019-05-03 NOTE — H&P PST ADULT - PRIMARY CARE PROVIDER
Dr Hayes pcp                                 Dr Roma Marcial Dr Hayes pcp   [phone]                        Dr Roma Marcial  hematologist

## 2019-05-04 PROBLEM — M10.9 GOUT, UNSPECIFIED: Chronic | Status: ACTIVE | Noted: 2019-05-03

## 2019-05-04 PROBLEM — R42 DIZZINESS AND GIDDINESS: Chronic | Status: ACTIVE | Noted: 2019-05-03

## 2019-05-04 PROBLEM — D46.9 MYELODYSPLASTIC SYNDROME, UNSPECIFIED: Chronic | Status: ACTIVE | Noted: 2019-03-26

## 2019-05-07 ENCOUNTER — RESULT REVIEW (OUTPATIENT)
Age: 84
End: 2019-05-07

## 2019-05-07 ENCOUNTER — LABORATORY RESULT (OUTPATIENT)
Age: 84
End: 2019-05-07

## 2019-05-07 LAB
T3 SERPL-MCNC: 176 NG/DL
T4 FREE SERPL-MCNC: 2.4 NG/DL
THYROGLOB AB SERPL-ACNC: <20 IU/ML
THYROPEROXIDASE AB SERPL IA-ACNC: 68.3 IU/ML
TSH SERPL-ACNC: <0.01 UIU/ML

## 2019-05-10 ENCOUNTER — EMERGENCY (EMERGENCY)
Facility: HOSPITAL | Age: 84
LOS: 1 days | Discharge: HOME | End: 2019-05-10
Attending: EMERGENCY MEDICINE | Admitting: EMERGENCY MEDICINE
Payer: MEDICARE

## 2019-05-10 VITALS
RESPIRATION RATE: 14 BRPM | HEIGHT: 62 IN | DIASTOLIC BLOOD PRESSURE: 56 MMHG | WEIGHT: 149.91 LBS | TEMPERATURE: 98 F | OXYGEN SATURATION: 99 % | HEART RATE: 60 BPM | SYSTOLIC BLOOD PRESSURE: 111 MMHG

## 2019-05-10 VITALS
DIASTOLIC BLOOD PRESSURE: 48 MMHG | TEMPERATURE: 98 F | RESPIRATION RATE: 20 BRPM | SYSTOLIC BLOOD PRESSURE: 143 MMHG | HEART RATE: 68 BPM | OXYGEN SATURATION: 96 %

## 2019-05-10 LAB
ALBUMIN SERPL ELPH-MCNC: 3.4 G/DL — SIGNIFICANT CHANGE UP (ref 3.3–5)
ALLERGY+IMMUNOLOGY DIAG STUDY NOTE: SIGNIFICANT CHANGE UP
ALP SERPL-CCNC: 57 U/L — SIGNIFICANT CHANGE UP (ref 30–120)
ALT FLD-CCNC: 23 U/L DA — SIGNIFICANT CHANGE UP (ref 10–60)
ANION GAP SERPL CALC-SCNC: 9 MMOL/L — SIGNIFICANT CHANGE UP (ref 5–17)
ANISOCYTOSIS BLD QL: SLIGHT — SIGNIFICANT CHANGE UP
APTT BLD: 26.5 SEC — LOW (ref 28.5–37)
AST SERPL-CCNC: 17 U/L — SIGNIFICANT CHANGE UP (ref 10–40)
BASOPHILS # BLD AUTO: 0.03 K/UL — SIGNIFICANT CHANGE UP (ref 0–0.2)
BASOPHILS NFR BLD AUTO: 0.5 % — SIGNIFICANT CHANGE UP (ref 0–2)
BILIRUB SERPL-MCNC: 0.9 MG/DL — SIGNIFICANT CHANGE UP (ref 0.2–1.2)
BUN SERPL-MCNC: 41 MG/DL — HIGH (ref 7–23)
CALCIUM SERPL-MCNC: 9.7 MG/DL — SIGNIFICANT CHANGE UP (ref 8.4–10.5)
CHLORIDE SERPL-SCNC: 113 MMOL/L — HIGH (ref 96–108)
CO2 SERPL-SCNC: 23 MMOL/L — SIGNIFICANT CHANGE UP (ref 22–31)
CREAT SERPL-MCNC: 1.71 MG/DL — HIGH (ref 0.5–1.3)
ELLIPTOCYTES BLD QL SMEAR: SLIGHT — SIGNIFICANT CHANGE UP
EOSINOPHIL # BLD AUTO: 0.1 K/UL — SIGNIFICANT CHANGE UP (ref 0–0.5)
EOSINOPHIL NFR BLD AUTO: 1.8 % — SIGNIFICANT CHANGE UP (ref 0–6)
GLUCOSE SERPL-MCNC: 107 MG/DL — HIGH (ref 70–99)
HCT VFR BLD CALC: 22 % — LOW (ref 34.5–45)
HGB BLD-MCNC: 7 G/DL — CRITICAL LOW (ref 11.5–15.5)
HYPOCHROMIA BLD QL: SLIGHT — SIGNIFICANT CHANGE UP
IMM GRANULOCYTES NFR BLD AUTO: 0.2 % — SIGNIFICANT CHANGE UP (ref 0–1.5)
INR BLD: 1.2 RATIO — HIGH (ref 0.88–1.16)
LYMPHOCYTES # BLD AUTO: 0.96 K/UL — LOW (ref 1–3.3)
LYMPHOCYTES # BLD AUTO: 16.8 % — SIGNIFICANT CHANGE UP (ref 13–44)
MACROCYTES BLD QL: SLIGHT — SIGNIFICANT CHANGE UP
MANUAL SMEAR VERIFICATION: SIGNIFICANT CHANGE UP
MCHC RBC-ENTMCNC: 29 PG — SIGNIFICANT CHANGE UP (ref 27–34)
MCHC RBC-ENTMCNC: 31.8 GM/DL — LOW (ref 32–36)
MCV RBC AUTO: 91.3 FL — SIGNIFICANT CHANGE UP (ref 80–100)
MONOCYTES # BLD AUTO: 0.85 K/UL — SIGNIFICANT CHANGE UP (ref 0–0.9)
MONOCYTES NFR BLD AUTO: 14.9 % — HIGH (ref 2–14)
NEUTROPHILS # BLD AUTO: 3.76 K/UL — SIGNIFICANT CHANGE UP (ref 1.8–7.4)
NEUTROPHILS NFR BLD AUTO: 65.8 % — SIGNIFICANT CHANGE UP (ref 43–77)
NRBC # BLD: 0 /100 WBCS — SIGNIFICANT CHANGE UP (ref 0–0)
OVALOCYTES BLD QL SMEAR: SLIGHT — SIGNIFICANT CHANGE UP
PLAT MORPH BLD: NORMAL — SIGNIFICANT CHANGE UP
PLATELET # BLD AUTO: 164 K/UL — SIGNIFICANT CHANGE UP (ref 150–400)
POIKILOCYTOSIS BLD QL AUTO: SLIGHT — SIGNIFICANT CHANGE UP
POTASSIUM SERPL-MCNC: 4.6 MMOL/L — SIGNIFICANT CHANGE UP (ref 3.5–5.3)
POTASSIUM SERPL-SCNC: 4.6 MMOL/L — SIGNIFICANT CHANGE UP (ref 3.5–5.3)
PROT SERPL-MCNC: 6.4 G/DL — SIGNIFICANT CHANGE UP (ref 6–8.3)
PROTHROM AB SERPL-ACNC: 13.1 SEC — HIGH (ref 10–12.9)
RBC # BLD: 2.41 M/UL — LOW (ref 3.8–5.2)
RBC # FLD: 24.2 % — HIGH (ref 10.3–14.5)
RBC BLD AUTO: ABNORMAL
SODIUM SERPL-SCNC: 145 MMOL/L — SIGNIFICANT CHANGE UP (ref 135–145)
WBC # BLD: 5.71 K/UL — SIGNIFICANT CHANGE UP (ref 3.8–10.5)
WBC # FLD AUTO: 5.71 K/UL — SIGNIFICANT CHANGE UP (ref 3.8–10.5)

## 2019-05-10 PROCEDURE — 86901 BLOOD TYPING SEROLOGIC RH(D): CPT

## 2019-05-10 PROCEDURE — P9016: CPT

## 2019-05-10 PROCEDURE — 99284 EMERGENCY DEPT VISIT MOD MDM: CPT

## 2019-05-10 PROCEDURE — 86900 BLOOD TYPING SEROLOGIC ABO: CPT

## 2019-05-10 PROCEDURE — 85610 PROTHROMBIN TIME: CPT

## 2019-05-10 PROCEDURE — 86923 COMPATIBILITY TEST ELECTRIC: CPT

## 2019-05-10 PROCEDURE — 85027 COMPLETE CBC AUTOMATED: CPT

## 2019-05-10 PROCEDURE — 85730 THROMBOPLASTIN TIME PARTIAL: CPT

## 2019-05-10 PROCEDURE — 86850 RBC ANTIBODY SCREEN: CPT

## 2019-05-10 PROCEDURE — 36415 COLL VENOUS BLD VENIPUNCTURE: CPT

## 2019-05-10 PROCEDURE — 99285 EMERGENCY DEPT VISIT HI MDM: CPT | Mod: 25

## 2019-05-10 PROCEDURE — 36430 TRANSFUSION BLD/BLD COMPNT: CPT

## 2019-05-10 PROCEDURE — 80053 COMPREHEN METABOLIC PANEL: CPT

## 2019-05-10 NOTE — ED ADULT NURSE NOTE - OBJECTIVE STATEMENT
Pt states she was sent to ED for blood transfusion by hem/onc as she needs it prior to thyroid sx on monday.  Pt denies cp, sob, n/v or any other complaints.  daughter at bedside.

## 2019-05-10 NOTE — ED PROVIDER NOTE - CARE PROVIDER_API CALL
Roma Marcial)  Hematology; Internal Medicine  [address], [address]  [place] [address]  Phone: [phone]  Fax: [phone]  Follow Up Time: 1-3 Days

## 2019-05-10 NOTE — ED ADULT NURSE REASSESSMENT NOTE - NS ED NURSE REASSESS COMMENT FT1
Second unit PRBCs infusing, pt tolerating well.  See transfusion administration record for VS.  daughter at bedside.
Tolerating first unit PRBC well.  Daughter at bedside.
Pt tolerated second PRBC unit well.  See VS charting on blood administration record.  daughter at bedside.

## 2019-05-10 NOTE — ED PROVIDER NOTE - PMH
Anemia    Breast Cancer  Left Breast  tx surgery , rt and tamoxifen  Gout  2018  H/O hyperthyroidism    Hypercholesterolemia    Hyperlipidemia    MDS (myelodysplastic syndrome)    Vertigo  last summer 2018

## 2019-05-10 NOTE — ED ADULT NURSE NOTE - NSIMPLEMENTINTERV_GEN_ALL_ED
Implemented All Universal Safety Interventions:  [place] to call system. Call bell, personal items and telephone within reach. Instruct patient to call for assistance. Room bathroom lighting operational. Non-slip footwear when patient is off stretcher. Physically safe environment: no spills, clutter or unnecessary equipment. Stretcher in lowest position, wheels locked, appropriate side rails in place.

## 2019-05-12 ENCOUNTER — TRANSCRIPTION ENCOUNTER (OUTPATIENT)
Age: 84
End: 2019-05-12

## 2019-05-12 PROBLEM — Z86.39 PERSONAL HISTORY OF OTHER ENDOCRINE, NUTRITIONAL AND METABOLIC DISEASE: Chronic | Status: ACTIVE | Noted: 2019-05-03

## 2019-05-13 ENCOUNTER — OUTPATIENT (OUTPATIENT)
Dept: OUTPATIENT SERVICES | Facility: HOSPITAL | Age: 84
LOS: 1 days | Discharge: HOME | End: 2019-05-13
Payer: MEDICARE

## 2019-05-13 ENCOUNTER — OTHER (OUTPATIENT)
Age: 84
End: 2019-05-13

## 2019-05-13 ENCOUNTER — RESULT REVIEW (OUTPATIENT)
Age: 84
End: 2019-05-13

## 2019-05-13 ENCOUNTER — APPOINTMENT (OUTPATIENT)
Dept: SURGERY | Facility: HOSPITAL | Age: 84
End: 2019-05-13

## 2019-05-13 VITALS
RESPIRATION RATE: 16 BRPM | SYSTOLIC BLOOD PRESSURE: 157 MMHG | DIASTOLIC BLOOD PRESSURE: 51 MMHG | HEIGHT: 63 IN | WEIGHT: 151.02 LBS | TEMPERATURE: 98 F | OXYGEN SATURATION: 95 % | HEART RATE: 65 BPM

## 2019-05-13 VITALS
SYSTOLIC BLOOD PRESSURE: 126 MMHG | DIASTOLIC BLOOD PRESSURE: 72 MMHG | OXYGEN SATURATION: 98 % | HEART RATE: 58 BPM | RESPIRATION RATE: 17 BRPM

## 2019-05-13 DIAGNOSIS — E04.1 NONTOXIC SINGLE THYROID NODULE: ICD-10-CM

## 2019-05-13 LAB
HCT VFR BLD CALC: 33.7 % — LOW (ref 34.5–45)
HCT VFR BLDV CALC: 34.4 % — LOW (ref 34.5–45)
HGB BLD-MCNC: 10.6 G/DL — LOW (ref 11.5–15.5)
MCHC RBC-ENTMCNC: 28.8 PG — SIGNIFICANT CHANGE UP (ref 27–34)
MCHC RBC-ENTMCNC: 31.5 % — LOW (ref 32–36)
MCV RBC AUTO: 91.6 FL — SIGNIFICANT CHANGE UP (ref 80–100)
NRBC # FLD: 0 K/UL — SIGNIFICANT CHANGE UP (ref 0–0)
PLATELET # BLD AUTO: 158 K/UL — SIGNIFICANT CHANGE UP (ref 150–400)
PMV BLD: SIGNIFICANT CHANGE UP FL (ref 7–13)
RBC # BLD: 3.68 M/UL — LOW (ref 3.8–5.2)
RBC # FLD: 19.2 % — HIGH (ref 10.3–14.5)
WBC # BLD: 8.64 K/UL — SIGNIFICANT CHANGE UP (ref 3.8–10.5)
WBC # FLD AUTO: 8.64 K/UL — SIGNIFICANT CHANGE UP (ref 3.8–10.5)

## 2019-05-13 PROCEDURE — 88307 TISSUE EXAM BY PATHOLOGIST: CPT | Mod: 26

## 2019-05-13 PROCEDURE — 60220 PARTIAL REMOVAL OF THYROID: CPT | Mod: AS

## 2019-05-13 PROCEDURE — 13132 CMPLX RPR F/C/C/M/N/AX/G/H/F: CPT | Mod: 59

## 2019-05-13 PROCEDURE — 60220 PARTIAL REMOVAL OF THYROID: CPT

## 2019-05-13 RX ORDER — ACETAMINOPHEN 500 MG
2 TABLET ORAL
Qty: 0 | Refills: 0 | DISCHARGE
Start: 2019-05-13

## 2019-05-13 RX ORDER — METHIMAZOLE 10 MG/1
0 TABLET ORAL
Qty: 0 | Refills: 0 | DISCHARGE

## 2019-05-13 RX ORDER — ACETAMINOPHEN 500 MG
650 TABLET ORAL EVERY 6 HOURS
Refills: 0 | Status: DISCONTINUED | OUTPATIENT
Start: 2019-05-13 | End: 2019-05-28

## 2019-05-13 RX ORDER — SODIUM CHLORIDE 9 MG/ML
1000 INJECTION, SOLUTION INTRAVENOUS
Refills: 0 | Status: DISCONTINUED | OUTPATIENT
Start: 2019-05-13 | End: 2019-05-28

## 2019-05-13 NOTE — ASU DISCHARGE PLAN (ADULT/PEDIATRIC) - PLEASE INDICATE TEMPERATURE IN FAHRENHEIT OR CELSIUS
[facility] [address] Patient Status:  Outpatient   Age/Gender 11 month old male MRN [id number]   Location [address] Attending Chasity Reeder MD   Hosp Day # 0 PCP Radha Valera MD       Anesthesia Post-op Note    * No procedure
Mother reports pt has had upper abdominal pain since Sunday. Pt has seen PCP for the same earlier this week and put on pepcid. Mother states the Prilosec is not helping. Reports nausea, but denies vomiting.
100.1

## 2019-05-13 NOTE — ASU DISCHARGE PLAN (ADULT/PEDIATRIC) - CALL YOUR DOCTOR IF YOU HAVE ANY OF THE FOLLOWING:
Swelling that gets worse/Bleeding that does not stop/Pain not relieved by Medications/Nausea and vomiting that does not stop/Numbness, tingling, color or temperature change to extremity Wound/Surgical Site with redness, or foul smelling discharge or pus/Numbness, tingling, color or temperature change to extremity/Bleeding that does not stop/Swelling that gets worse/Pain not relieved by Medications/Unable to urinate/Inability to tolerate liquids or foods/Fever greater than (need to indicate Fahrenheit or Celsius)/Nausea and vomiting that does not stop

## 2019-05-13 NOTE — ASU DISCHARGE PLAN (ADULT/PEDIATRIC) - FOLLOW UP APPOINTMENTS
911 or go to the nearest Emergency Room [facility]: [facility] (Adult):/may also call Recovery Room (PACU) 24/7 @ [phone]

## 2019-05-13 NOTE — ASU DISCHARGE PLAN (ADULT/PEDIATRIC) - CARE PROVIDER_API CALL
Kash Melendez)  Plastic Surgery; Surgery  [address], [address]  [place] [address]  Phone: [phone]  Fax: [phone]  Follow Up Time:

## 2019-05-13 NOTE — ASU DISCHARGE PLAN (ADULT/PEDIATRIC) - NURSING INSTRUCTIONS
******************************************************  You were given intravenous TYLENOL for pain management at 10:30 AM. Please DO NOT take any products containing TYLENOL or ACETAMINOPHEN, such as VICODIN, PERCOCET, EXCEDRIN, and any over-the-counter cold medication for the next 6 hours (until 4:30 PM). DO NOT TAKE MORE THAN 3000 MG OF TYLENOL in a 24 hour period.   ****************************************************** ******************************************************  You were given intravenous TYLENOL for pain management at 10:30 AM. Please DO NOT take any products containing TYLENOL or ACETAMINOPHEN, such as VICODIN, PERCOCET, EXCEDRIN, and any over-the-counter cold medication for the next 6 hours (until 4:30 PM). DO NOT TAKE MORE THAN 3000 MG OF TYLENOL in a 24 hour period.   ******************************************************    Call MD for any neck swelling, any shortness of breath, or any redness /drainage from wound. Stay away from hot, spicy and jagged edged foods.  Call MD for any nasal tip, fingertip or extremity numbness/tingling.

## 2019-05-13 NOTE — ASU PREOP CHECKLIST - WARM FLUIDS/WARM BLANKETS
----- Message from ÁNGEL Mercedes sent at 5/9/2019 11:49 AM EDT -----  Please inform patient her pap smear returned negative (normal). It did show bacterial vaginosis as expected and the flagyl prescribed last visit was the appropriate therapy. Thank you.     no

## 2019-05-14 ENCOUNTER — APPOINTMENT (OUTPATIENT)
Dept: SURGERY | Facility: CLINIC | Age: 84
End: 2019-05-14
Payer: MEDICARE

## 2019-05-14 ENCOUNTER — APPOINTMENT (OUTPATIENT)
Dept: SURGERY | Facility: CLINIC | Age: 84
End: 2019-05-14

## 2019-05-14 PROCEDURE — 99024 POSTOP FOLLOW-UP VISIT: CPT

## 2019-05-14 NOTE — PHYSICAL EXAM
[de-identified] : Minimal postop swelling [Midline] : located in midline position [Normal] : orientation to person, place, and time: normal

## 2019-05-14 NOTE — ASSESSMENT
[FreeTextEntry1] : s/p Thyroid lobectomy\par daily care\par drain removed\par call 1 week for path report\par f/u Dr Allison as scheduled\par f/u 4 weeks

## 2019-05-16 LAB — SURGICAL PATHOLOGY STUDY: SIGNIFICANT CHANGE UP

## 2019-05-20 ENCOUNTER — OTHER (OUTPATIENT)
Age: 84
End: 2019-05-20

## 2019-06-13 ENCOUNTER — APPOINTMENT (OUTPATIENT)
Dept: SURGERY | Facility: CLINIC | Age: 84
End: 2019-06-13
Payer: MEDICARE

## 2019-06-13 PROCEDURE — 99024 POSTOP FOLLOW-UP VISIT: CPT

## 2019-06-13 PROCEDURE — 36415 COLL VENOUS BLD VENIPUNCTURE: CPT

## 2019-06-13 NOTE — HISTORY OF PRESENT ILLNESS
[de-identified] : Pt 6 weeks s/p Thyroid lobectomy for NIFTP doing well but not feeling 100%. Pt has appointment with Dr Allison in 2 weeks

## 2019-06-14 LAB
T3 SERPL-MCNC: 155 NG/DL
T4 FREE SERPL-MCNC: 1.6 NG/DL
TSH SERPL-ACNC: 0.01 UIU/ML

## 2019-06-17 ENCOUNTER — RESULT REVIEW (OUTPATIENT)
Age: 84
End: 2019-06-17

## 2019-06-17 LAB
THYROGLOB AB SERPL-ACNC: 29.1 IU/ML
THYROPEROXIDASE AB SERPL IA-ACNC: 93 IU/ML

## 2019-06-20 ENCOUNTER — RESULT REVIEW (OUTPATIENT)
Age: 84
End: 2019-06-20

## 2019-06-24 ENCOUNTER — OTHER (OUTPATIENT)
Age: 84
End: 2019-06-24

## 2019-06-29 ENCOUNTER — EMERGENCY (EMERGENCY)
Facility: HOSPITAL | Age: 84
LOS: 1 days | Discharge: HOME | End: 2019-06-29
Attending: EMERGENCY MEDICINE | Admitting: EMERGENCY MEDICINE
Payer: MEDICARE

## 2019-06-29 VITALS
SYSTOLIC BLOOD PRESSURE: 134 MMHG | WEIGHT: 139.99 LBS | RESPIRATION RATE: 17 BRPM | HEIGHT: 62 IN | HEART RATE: 60 BPM | OXYGEN SATURATION: 97 % | DIASTOLIC BLOOD PRESSURE: 60 MMHG | TEMPERATURE: 97 F

## 2019-06-29 VITALS
HEART RATE: 59 BPM | TEMPERATURE: 98 F | DIASTOLIC BLOOD PRESSURE: 48 MMHG | OXYGEN SATURATION: 98 % | RESPIRATION RATE: 20 BRPM | SYSTOLIC BLOOD PRESSURE: 123 MMHG

## 2019-06-29 LAB
ALBUMIN SERPL ELPH-MCNC: 3.4 G/DL — SIGNIFICANT CHANGE UP (ref 3.3–5)
ALP SERPL-CCNC: 67 U/L — SIGNIFICANT CHANGE UP (ref 30–120)
ALT FLD-CCNC: 27 U/L DA — SIGNIFICANT CHANGE UP (ref 10–60)
ANION GAP SERPL CALC-SCNC: 8 MMOL/L — SIGNIFICANT CHANGE UP (ref 5–17)
ANISOCYTOSIS BLD QL: SLIGHT — SIGNIFICANT CHANGE UP
APPEARANCE UR: CLEAR — SIGNIFICANT CHANGE UP
APTT BLD: 27.4 SEC — LOW (ref 28.5–37)
AST SERPL-CCNC: 17 U/L — SIGNIFICANT CHANGE UP (ref 10–40)
BACTERIA # UR AUTO: ABNORMAL
BASOPHILS # BLD AUTO: 0.06 K/UL — SIGNIFICANT CHANGE UP (ref 0–0.2)
BASOPHILS NFR BLD AUTO: 0.6 % — SIGNIFICANT CHANGE UP (ref 0–2)
BILIRUB SERPL-MCNC: 0.5 MG/DL — SIGNIFICANT CHANGE UP (ref 0.2–1.2)
BILIRUB UR-MCNC: NEGATIVE — SIGNIFICANT CHANGE UP
BLD GP AB SCN SERPL QL: SIGNIFICANT CHANGE UP
BUN SERPL-MCNC: 81 MG/DL — HIGH (ref 7–23)
CALCIUM SERPL-MCNC: 8.5 MG/DL — SIGNIFICANT CHANGE UP (ref 8.4–10.5)
CHLORIDE SERPL-SCNC: 108 MMOL/L — SIGNIFICANT CHANGE UP (ref 96–108)
CO2 SERPL-SCNC: 22 MMOL/L — SIGNIFICANT CHANGE UP (ref 22–31)
COLOR SPEC: YELLOW — SIGNIFICANT CHANGE UP
CREAT SERPL-MCNC: 1.97 MG/DL — HIGH (ref 0.5–1.3)
DIFF PNL FLD: NEGATIVE — SIGNIFICANT CHANGE UP
EOSINOPHIL # BLD AUTO: 0.16 K/UL — SIGNIFICANT CHANGE UP (ref 0–0.5)
EOSINOPHIL NFR BLD AUTO: 1.7 % — SIGNIFICANT CHANGE UP (ref 0–6)
EPI CELLS # UR: SIGNIFICANT CHANGE UP
GLUCOSE SERPL-MCNC: 113 MG/DL — HIGH (ref 70–99)
GLUCOSE UR QL: NEGATIVE MG/DL — SIGNIFICANT CHANGE UP
HCT VFR BLD CALC: 22.5 % — LOW (ref 34.5–45)
HGB BLD-MCNC: 7.2 G/DL — LOW (ref 11.5–15.5)
IMM GRANULOCYTES NFR BLD AUTO: 0.4 % — SIGNIFICANT CHANGE UP (ref 0–1.5)
INR BLD: 1.12 RATIO — SIGNIFICANT CHANGE UP (ref 0.88–1.16)
KETONES UR-MCNC: NEGATIVE — SIGNIFICANT CHANGE UP
LEUKOCYTE ESTERASE UR-ACNC: ABNORMAL
LYMPHOCYTES # BLD AUTO: 1.36 K/UL — SIGNIFICANT CHANGE UP (ref 1–3.3)
LYMPHOCYTES # BLD AUTO: 14.2 % — SIGNIFICANT CHANGE UP (ref 13–44)
MACROCYTES BLD QL: SLIGHT — SIGNIFICANT CHANGE UP
MANUAL SMEAR VERIFICATION: SIGNIFICANT CHANGE UP
MCHC RBC-ENTMCNC: 32 GM/DL — SIGNIFICANT CHANGE UP (ref 32–36)
MCHC RBC-ENTMCNC: 32.4 PG — SIGNIFICANT CHANGE UP (ref 27–34)
MCV RBC AUTO: 101.4 FL — HIGH (ref 80–100)
MONOCYTES # BLD AUTO: 1.06 K/UL — HIGH (ref 0–0.9)
MONOCYTES NFR BLD AUTO: 11.1 % — SIGNIFICANT CHANGE UP (ref 2–14)
NEUTROPHILS # BLD AUTO: 6.89 K/UL — SIGNIFICANT CHANGE UP (ref 1.8–7.4)
NEUTROPHILS NFR BLD AUTO: 72 % — SIGNIFICANT CHANGE UP (ref 43–77)
NITRITE UR-MCNC: NEGATIVE — SIGNIFICANT CHANGE UP
NRBC # BLD: 0 /100 WBCS — SIGNIFICANT CHANGE UP (ref 0–0)
OVALOCYTES BLD QL SMEAR: SLIGHT — SIGNIFICANT CHANGE UP
PH UR: 6 — SIGNIFICANT CHANGE UP (ref 5–8)
PLAT MORPH BLD: NORMAL — SIGNIFICANT CHANGE UP
PLATELET # BLD AUTO: 268 K/UL — SIGNIFICANT CHANGE UP (ref 150–400)
POTASSIUM SERPL-MCNC: 4.2 MMOL/L — SIGNIFICANT CHANGE UP (ref 3.5–5.3)
POTASSIUM SERPL-SCNC: 4.2 MMOL/L — SIGNIFICANT CHANGE UP (ref 3.5–5.3)
PROT SERPL-MCNC: 6.6 G/DL — SIGNIFICANT CHANGE UP (ref 6–8.3)
PROT UR-MCNC: NEGATIVE MG/DL — SIGNIFICANT CHANGE UP
PROTHROM AB SERPL-ACNC: 12.3 SEC — SIGNIFICANT CHANGE UP (ref 10–12.9)
RBC # BLD: 2.22 M/UL — LOW (ref 3.8–5.2)
RBC # FLD: 21.2 % — HIGH (ref 10.3–14.5)
RBC BLD AUTO: ABNORMAL
RBC CASTS # UR COMP ASSIST: SIGNIFICANT CHANGE UP /HPF (ref 0–4)
SODIUM SERPL-SCNC: 138 MMOL/L — SIGNIFICANT CHANGE UP (ref 135–145)
SP GR SPEC: 1.01 — SIGNIFICANT CHANGE UP (ref 1.01–1.02)
UROBILINOGEN FLD QL: NEGATIVE MG/DL — SIGNIFICANT CHANGE UP
WBC # BLD: 9.57 K/UL — SIGNIFICANT CHANGE UP (ref 3.8–10.5)
WBC # FLD AUTO: 9.57 K/UL — SIGNIFICANT CHANGE UP (ref 3.8–10.5)
WBC UR QL: SIGNIFICANT CHANGE UP

## 2019-06-29 PROCEDURE — 76857 US EXAM PELVIC LIMITED: CPT | Mod: 26

## 2019-06-29 PROCEDURE — 99285 EMERGENCY DEPT VISIT HI MDM: CPT

## 2019-06-29 PROCEDURE — 81001 URINALYSIS AUTO W/SCOPE: CPT

## 2019-06-29 PROCEDURE — 76857 US EXAM PELVIC LIMITED: CPT

## 2019-06-29 PROCEDURE — P9016: CPT

## 2019-06-29 PROCEDURE — 85027 COMPLETE CBC AUTOMATED: CPT

## 2019-06-29 PROCEDURE — 71045 X-RAY EXAM CHEST 1 VIEW: CPT

## 2019-06-29 PROCEDURE — 71045 X-RAY EXAM CHEST 1 VIEW: CPT | Mod: 26

## 2019-06-29 PROCEDURE — 93005 ELECTROCARDIOGRAM TRACING: CPT

## 2019-06-29 PROCEDURE — 86900 BLOOD TYPING SEROLOGIC ABO: CPT

## 2019-06-29 PROCEDURE — 36415 COLL VENOUS BLD VENIPUNCTURE: CPT

## 2019-06-29 PROCEDURE — 36430 TRANSFUSION BLD/BLD COMPNT: CPT

## 2019-06-29 PROCEDURE — 85730 THROMBOPLASTIN TIME PARTIAL: CPT

## 2019-06-29 PROCEDURE — 86923 COMPATIBILITY TEST ELECTRIC: CPT

## 2019-06-29 PROCEDURE — 76700 US EXAM ABDOM COMPLETE: CPT

## 2019-06-29 PROCEDURE — 93010 ELECTROCARDIOGRAM REPORT: CPT

## 2019-06-29 PROCEDURE — 85610 PROTHROMBIN TIME: CPT

## 2019-06-29 PROCEDURE — 86901 BLOOD TYPING SEROLOGIC RH(D): CPT

## 2019-06-29 PROCEDURE — 86850 RBC ANTIBODY SCREEN: CPT

## 2019-06-29 PROCEDURE — 99285 EMERGENCY DEPT VISIT HI MDM: CPT | Mod: 25

## 2019-06-29 PROCEDURE — 87086 URINE CULTURE/COLONY COUNT: CPT

## 2019-06-29 PROCEDURE — 76700 US EXAM ABDOM COMPLETE: CPT | Mod: 26

## 2019-06-29 PROCEDURE — 80053 COMPREHEN METABOLIC PANEL: CPT

## 2019-06-29 RX ORDER — ATENOLOL 25 MG/1
0 TABLET ORAL
Qty: 0 | Refills: 0 | DISCHARGE

## 2019-06-29 RX ORDER — CHOLECALCIFEROL (VITAMIN D3) 125 MCG
1 CAPSULE ORAL
Qty: 0 | Refills: 0 | DISCHARGE

## 2019-06-29 NOTE — ED PROVIDER NOTE - OBJECTIVE STATEMENT
pt referred by her md for elev cr (2.7 with baseline 1.7 last checked 10 days ago) and anemia (7) on labs done yest. pt reports chornic anemia, mult xfusions in past, had fecal occult blood yest, was neg. pt refusing guiaic. no fever, chills, ha, d/n/v, cp, sob, abd pain.  pmd - jackson  heme - moy  renal - none pt referred by her md for elev cr (2.7 with baseline 1.7 last checked 10 days ago) and anemia (7.7 with baseline 8-9) on labs done yest. pt reports chornic anemia, mult xfusions in past, had fecal occult blood yest, was neg. pt refusing guiaic. no fever, chills, ha, d/n/v, cp, sob, abd pain.  pmd - jovani  heme - moy  renal - none

## 2019-06-29 NOTE — ED ADULT NURSE NOTE - OBJECTIVE STATEMENT
pt sent by her md for elev cr and anemia (7) based on labs done yest. pt states chornic anemia, mult xfusions in past, fecal occult blood yest-neg. denies fever, chills, ha, d/n/v, cp, sob, abd pain. pt sent by her md for elev cr and anemia (7) based on labs done yest. pt states chornic anemia, mult xfusions in past, fecal occult blood yest-neg. denies fever, chills, ha, d/n/v, cp, sob, abd pain. Pt states IV can be placed in left a/c.

## 2019-06-29 NOTE — ED PROVIDER NOTE - PROGRESS NOTE DETAILS
Had an at length discussion with patient.  Patient wishes to leave at this time.  The patient understands that they are leaving against medical advice despite the risk of missing a potentially serious diagnosis which may lead to injury, disability and/or death.  I discussed with the patient which tests would need to be performed and what type of monitoring would be necessary for the patient as well.  I was unable to convince patient to stay for further workup.  The patient was given an opportunity to ask any questions as well.   The patient demonstrates understanding of all risks, is awake and alert and demonstrates competance to make medical decisions.  The patient understands that they may return at any time if desired. Discussed the importance of close, prompt medical follow-up.  Also present at bedside for discussions: daughter

## 2019-06-29 NOTE — ED PROVIDER NOTE - CLINICAL SUMMARY MEDICAL DECISION MAKING FREE TEXT BOX
pt with worsening of chronic anemia, and worsening renal function on labs done yest - ekg/labs/?xfuse

## 2019-06-30 LAB
CULTURE RESULTS: SIGNIFICANT CHANGE UP
SPECIMEN SOURCE: SIGNIFICANT CHANGE UP

## 2019-07-01 ENCOUNTER — APPOINTMENT (OUTPATIENT)
Dept: NUCLEAR MEDICINE | Facility: CLINIC | Age: 84
End: 2019-07-01
Payer: MEDICARE

## 2019-07-01 ENCOUNTER — OUTPATIENT (OUTPATIENT)
Dept: OUTPATIENT SERVICES | Facility: HOSPITAL | Age: 84
LOS: 1 days | End: 2019-07-01

## 2019-07-01 DIAGNOSIS — Z00.8 ENCOUNTER FOR OTHER GENERAL EXAMINATION: ICD-10-CM

## 2019-07-02 ENCOUNTER — APPOINTMENT (OUTPATIENT)
Dept: NUCLEAR MEDICINE | Facility: CLINIC | Age: 84
End: 2019-07-02

## 2019-07-02 ENCOUNTER — APPOINTMENT (OUTPATIENT)
Dept: CT IMAGING | Facility: CLINIC | Age: 84
End: 2019-07-02
Payer: MEDICARE

## 2019-07-02 ENCOUNTER — OUTPATIENT (OUTPATIENT)
Dept: OUTPATIENT SERVICES | Facility: HOSPITAL | Age: 84
LOS: 1 days | End: 2019-07-02

## 2019-07-02 ENCOUNTER — TRANSCRIPTION ENCOUNTER (OUTPATIENT)
Age: 84
End: 2019-07-02

## 2019-07-02 DIAGNOSIS — Z00.8 ENCOUNTER FOR OTHER GENERAL EXAMINATION: ICD-10-CM

## 2019-07-02 PROCEDURE — 74176 CT ABD & PELVIS W/O CONTRAST: CPT | Mod: 26

## 2019-07-02 PROCEDURE — 78014 THYROID IMAGING W/BLOOD FLOW: CPT | Mod: 26

## 2019-07-02 PROCEDURE — 71250 CT THORAX DX C-: CPT | Mod: 26

## 2019-07-31 ENCOUNTER — OUTPATIENT (OUTPATIENT)
Dept: OUTPATIENT SERVICES | Facility: HOSPITAL | Age: 84
LOS: 1 days | End: 2019-07-31

## 2019-07-31 VITALS
DIASTOLIC BLOOD PRESSURE: 60 MMHG | RESPIRATION RATE: 16 BRPM | SYSTOLIC BLOOD PRESSURE: 120 MMHG | WEIGHT: 141.98 LBS | HEIGHT: 62 IN | HEART RATE: 54 BPM | TEMPERATURE: 97 F | OXYGEN SATURATION: 97 %

## 2019-07-31 DIAGNOSIS — Z01.818 ENCOUNTER FOR OTHER PREPROCEDURAL EXAMINATION: ICD-10-CM

## 2019-07-31 DIAGNOSIS — Z98.890 OTHER SPECIFIED POSTPROCEDURAL STATES: Chronic | ICD-10-CM

## 2019-07-31 DIAGNOSIS — E05.00 THYROTOXICOSIS WITH DIFFUSE GOITER WITHOUT THYROTOXIC CRISIS OR STORM: ICD-10-CM

## 2019-07-31 LAB
ANION GAP SERPL CALC-SCNC: 9 MMO/L — SIGNIFICANT CHANGE UP (ref 7–14)
ANISOCYTOSIS BLD QL: SLIGHT — SIGNIFICANT CHANGE UP
ANISOCYTOSIS BLD QL: SLIGHT — SIGNIFICANT CHANGE UP
BASOPHILS # BLD AUTO: 0.04 K/UL — SIGNIFICANT CHANGE UP (ref 0–0.2)
BASOPHILS NFR BLD AUTO: 0.6 % — SIGNIFICANT CHANGE UP (ref 0–2)
BUN SERPL-MCNC: 50 MG/DL — HIGH (ref 7–23)
CALCIUM SERPL-MCNC: 10.2 MG/DL — SIGNIFICANT CHANGE UP (ref 8.4–10.5)
CHLORIDE SERPL-SCNC: 112 MMOL/L — HIGH (ref 98–107)
CO2 SERPL-SCNC: 21 MMOL/L — LOW (ref 22–31)
CREAT SERPL-MCNC: 1.68 MG/DL — HIGH (ref 0.5–1.3)
DACRYOCYTES BLD QL SMEAR: SLIGHT — SIGNIFICANT CHANGE UP
DACRYOCYTES BLD QL SMEAR: SLIGHT — SIGNIFICANT CHANGE UP
EOSINOPHIL # BLD AUTO: 0.12 K/UL — SIGNIFICANT CHANGE UP (ref 0–0.5)
EOSINOPHIL NFR BLD AUTO: 1.9 % — SIGNIFICANT CHANGE UP (ref 0–6)
GLUCOSE SERPL-MCNC: 80 MG/DL — SIGNIFICANT CHANGE UP (ref 70–99)
HCT VFR BLD CALC: 28.3 % — LOW (ref 34.5–45)
HCT VFR BLD CALC: 28.3 % — LOW (ref 34.5–45)
HGB BLD-MCNC: 8.5 G/DL — LOW (ref 11.5–15.5)
HGB BLD-MCNC: 8.5 G/DL — LOW (ref 11.5–15.5)
HYPOCHROMIA BLD QL: SLIGHT — SIGNIFICANT CHANGE UP
HYPOCHROMIA BLD QL: SLIGHT — SIGNIFICANT CHANGE UP
IMM GRANULOCYTES NFR BLD AUTO: 0.5 % — SIGNIFICANT CHANGE UP (ref 0–1.5)
LG PLATELETS BLD QL AUTO: SLIGHT — SIGNIFICANT CHANGE UP
LG PLATELETS BLD QL AUTO: SLIGHT — SIGNIFICANT CHANGE UP
LYMPHOCYTES # BLD AUTO: 1.25 K/UL — SIGNIFICANT CHANGE UP (ref 1–3.3)
LYMPHOCYTES # BLD AUTO: 19.9 % — SIGNIFICANT CHANGE UP (ref 13–44)
MACROCYTES BLD QL: SIGNIFICANT CHANGE UP
MACROCYTES BLD QL: SIGNIFICANT CHANGE UP
MANUAL SMEAR VERIFICATION: SIGNIFICANT CHANGE UP
MANUAL SMEAR VERIFICATION: SIGNIFICANT CHANGE UP
MCHC RBC-ENTMCNC: 30 % — LOW (ref 32–36)
MCHC RBC-ENTMCNC: 30 % — LOW (ref 32–36)
MCHC RBC-ENTMCNC: 33.2 PG — SIGNIFICANT CHANGE UP (ref 27–34)
MCHC RBC-ENTMCNC: 33.2 PG — SIGNIFICANT CHANGE UP (ref 27–34)
MCV RBC AUTO: 110.5 FL — HIGH (ref 80–100)
MCV RBC AUTO: 110.5 FL — HIGH (ref 80–100)
MICROCYTES BLD QL: SLIGHT — SIGNIFICANT CHANGE UP
MICROCYTES BLD QL: SLIGHT — SIGNIFICANT CHANGE UP
MONOCYTES # BLD AUTO: 0.55 K/UL — SIGNIFICANT CHANGE UP (ref 0–0.9)
MONOCYTES NFR BLD AUTO: 8.8 % — SIGNIFICANT CHANGE UP (ref 2–14)
NEUTROPHILS # BLD AUTO: 4.28 K/UL — SIGNIFICANT CHANGE UP (ref 1.8–7.4)
NEUTROPHILS NFR BLD AUTO: 68.3 % — SIGNIFICANT CHANGE UP (ref 43–77)
NRBC # FLD: 0.03 K/UL — SIGNIFICANT CHANGE UP (ref 0–0)
NRBC # FLD: 0.03 K/UL — SIGNIFICANT CHANGE UP (ref 0–0)
OVALOCYTES BLD QL SMEAR: SLIGHT — SIGNIFICANT CHANGE UP
OVALOCYTES BLD QL SMEAR: SLIGHT — SIGNIFICANT CHANGE UP
PLATELET # BLD AUTO: 152 K/UL — SIGNIFICANT CHANGE UP (ref 150–400)
PLATELET # BLD AUTO: 152 K/UL — SIGNIFICANT CHANGE UP (ref 150–400)
PLATELET COUNT - ESTIMATE: NORMAL — SIGNIFICANT CHANGE UP
PLATELET COUNT - ESTIMATE: NORMAL — SIGNIFICANT CHANGE UP
PMV BLD: SIGNIFICANT CHANGE UP FL (ref 7–13)
PMV BLD: SIGNIFICANT CHANGE UP FL (ref 7–13)
POTASSIUM SERPL-MCNC: 5.2 MMOL/L — SIGNIFICANT CHANGE UP (ref 3.5–5.3)
POTASSIUM SERPL-SCNC: 5.2 MMOL/L — SIGNIFICANT CHANGE UP (ref 3.5–5.3)
RBC # BLD: 2.56 M/UL — LOW (ref 3.8–5.2)
RBC # BLD: 2.56 M/UL — LOW (ref 3.8–5.2)
RBC # FLD: 20.3 % — HIGH (ref 10.3–14.5)
RBC # FLD: 20.3 % — HIGH (ref 10.3–14.5)
SCHISTOCYTES BLD QL AUTO: SLIGHT — SIGNIFICANT CHANGE UP
SCHISTOCYTES BLD QL AUTO: SLIGHT — SIGNIFICANT CHANGE UP
SODIUM SERPL-SCNC: 142 MMOL/L — SIGNIFICANT CHANGE UP (ref 135–145)
T3 SERPL-MCNC: 89.1 NG/DL — SIGNIFICANT CHANGE UP (ref 80–200)
T4 FREE SERPL-MCNC: 0.73 NG/DL — LOW (ref 0.9–1.8)
TSH SERPL-MCNC: < 0.1 UIU/ML — LOW (ref 0.27–4.2)
WBC # BLD: 6.27 K/UL — SIGNIFICANT CHANGE UP (ref 3.8–10.5)
WBC # BLD: 6.27 K/UL — SIGNIFICANT CHANGE UP (ref 3.8–10.5)
WBC # FLD AUTO: 6.27 K/UL — SIGNIFICANT CHANGE UP (ref 3.8–10.5)
WBC # FLD AUTO: 6.27 K/UL — SIGNIFICANT CHANGE UP (ref 3.8–10.5)

## 2019-07-31 RX ORDER — ATENOLOL 25 MG/1
1 TABLET ORAL
Qty: 0 | Refills: 0 | DISCHARGE

## 2019-07-31 RX ORDER — RANITIDINE HYDROCHLORIDE 150 MG/1
1 TABLET, FILM COATED ORAL
Qty: 0 | Refills: 0 | DISCHARGE

## 2019-07-31 RX ORDER — SODIUM CHLORIDE 9 MG/ML
1000 INJECTION, SOLUTION INTRAVENOUS
Refills: 0 | Status: DISCONTINUED | OUTPATIENT
Start: 2019-08-12 | End: 2019-08-13

## 2019-07-31 NOTE — H&P PST ADULT - VENOUS THROMBOEMBOLISM CURRENT STATUS
(1) other risk factor (includes escalating BMI, pack-years of smoking, diabetes requiring insulin, chemotherapy, female gender and length of surgery)/(1) swollen legs (current)/(2) malignancy (present or previous)

## 2019-07-31 NOTE — H&P PST ADULT - NSICDXFAMILYHX_GEN_ALL_CORE_FT
FAMILY HISTORY:  FH: coronary artery disease, father, brother    Mother  Still living? Unknown  FH: breast cancer, Age at diagnosis: Age Unknown    Sibling  Still living? Unknown  FH: colon cancer, Age at diagnosis: Age Unknown

## 2019-07-31 NOTE — H&P PST ADULT - NSICDXPASTMEDICALHX_GEN_ALL_CORE_FT
PAST MEDICAL HISTORY:  Anemia     Breast Cancer Left Breast  tx surgery , rt and tamoxifen    Gout 2018    H/O hyperthyroidism     HTN (hypertension)     Hyperlipidemia     MDS (myelodysplastic syndrome)     Vertigo last summer 2018 PAST MEDICAL HISTORY:  Anemia     Breast Cancer Left Breast  tx surgery , rt and tamoxifen    Gout 2018    H/O hyperthyroidism     History of blood transfusion 6/2019    HTN (hypertension)     Hyperlipidemia     Iron excess     MDS (myelodysplastic syndrome)     Vertigo last summer 2018 PAST MEDICAL HISTORY:  Anemia     Breast Cancer Left Breast  tx surgery , rt and tamoxifen    Gout 2018    H/O hyperthyroidism     H/O thyrotoxicosis     History of blood transfusion 6/2019, "i may have hemachromatosis"    HTN (hypertension)     Hyperlipidemia     Iron excess     MDS (myelodysplastic syndrome)     Vertigo last summer 2018

## 2019-07-31 NOTE — H&P PST ADULT - HISTORY OF PRESENT ILLNESS
83 year old female presents with recent history of thyroidectomy of left lobe in May 2019. Patient reports imaging prior to study overshadowed the lobe and was not visualized well. Reports lethargy and muscle weakness resumed 4-6 weeks post-op. Patient was reevaluated and noted to have right diffuse goiter with thyrotoxicosis. Scheduled for complete thyroidectomy on 8/1219 83 year old female presents with recent history of thyrotoxicosis s/p left thyroidectomy in May 2019. Patient reports imaging prior to study overshadowed the lobe and was not visualized well. Reports lethargy and muscle weakness resumed 4-6 weeks post-op. Patient was reevaluated and noted to have right diffuse goiter with thyrotoxicosis. Scheduled for complete thyroidectomy on 8/1219

## 2019-07-31 NOTE — H&P PST ADULT - NSICDXPROBLEM_GEN_ALL_CORE_FT
DX: Thyroid goiter     Plan: Patient scheduled for surgery on 8/12/19    Patient provided with verbal and written presurgical instructions, patient verbalized understanding  with teach back.     Patient provided with famotidine for GI prophylaxis, patient verbalized understanding.    Patient provided with Chlorhexidine wash, verbal and written instructions. Patient verbalized understanding with teach back.   Medical evaluation requested by PST and surgeon, will obtain.  Recent hematologist note requested  Recent echocardiogram and stress test in chart DX: Thyroid goiter     Plan: Patient scheduled for surgery on 8/12/19    Patient provided with verbal and written presurgical instructions, patient verbalized understanding  with teach back.     Patient provided with famotidine for GI prophylaxis, patient verbalized understanding.    Patient provided with Chlorhexidine wash, verbal and written instructions. Patient verbalized understanding with teach back.   Medical evaluation requested by PST and surgeon, will obtain.  Recent hematologist note requested  Recent echocardiogram and stress test in chart  OR booking notified of PCN allergy

## 2019-08-09 PROBLEM — Z92.89 PERSONAL HISTORY OF OTHER MEDICAL TREATMENT: Chronic | Status: ACTIVE | Noted: 2019-07-31

## 2019-08-09 PROBLEM — I10 ESSENTIAL (PRIMARY) HYPERTENSION: Chronic | Status: ACTIVE | Noted: 2019-07-31

## 2019-08-09 PROBLEM — E83.19 OTHER DISORDERS OF IRON METABOLISM: Chronic | Status: ACTIVE | Noted: 2019-07-31

## 2019-08-09 PROBLEM — Z86.39 PERSONAL HISTORY OF OTHER ENDOCRINE, NUTRITIONAL AND METABOLIC DISEASE: Chronic | Status: ACTIVE | Noted: 2019-07-31

## 2019-08-11 ENCOUNTER — TRANSCRIPTION ENCOUNTER (OUTPATIENT)
Age: 84
End: 2019-08-11

## 2019-08-12 ENCOUNTER — RESULT REVIEW (OUTPATIENT)
Age: 84
End: 2019-08-12

## 2019-08-12 ENCOUNTER — OUTPATIENT (OUTPATIENT)
Dept: OUTPATIENT SERVICES | Facility: HOSPITAL | Age: 84
LOS: 1 days | Discharge: HOME | End: 2019-08-12
Payer: MEDICARE

## 2019-08-12 ENCOUNTER — APPOINTMENT (OUTPATIENT)
Dept: SURGERY | Facility: HOSPITAL | Age: 84
End: 2019-08-12

## 2019-08-12 ENCOUNTER — OTHER (OUTPATIENT)
Age: 84
End: 2019-08-12

## 2019-08-12 VITALS
SYSTOLIC BLOOD PRESSURE: 157 MMHG | WEIGHT: 141.98 LBS | HEART RATE: 49 BPM | HEIGHT: 62 IN | RESPIRATION RATE: 14 BRPM | TEMPERATURE: 98 F | DIASTOLIC BLOOD PRESSURE: 43 MMHG | OXYGEN SATURATION: 99 %

## 2019-08-12 VITALS
DIASTOLIC BLOOD PRESSURE: 71 MMHG | RESPIRATION RATE: 16 BRPM | OXYGEN SATURATION: 100 % | HEART RATE: 57 BPM | SYSTOLIC BLOOD PRESSURE: 134 MMHG

## 2019-08-12 DIAGNOSIS — E05.00 THYROTOXICOSIS WITH DIFFUSE GOITER WITHOUT THYROTOXIC CRISIS OR STORM: ICD-10-CM

## 2019-08-12 DIAGNOSIS — Z98.890 OTHER SPECIFIED POSTPROCEDURAL STATES: Chronic | ICD-10-CM

## 2019-08-12 LAB
CALCIUM SERPL-MCNC: 9.6 MG/DL — SIGNIFICANT CHANGE UP (ref 8.4–10.5)
CALCIUM SERPL-MCNC: 9.8 MG/DL — SIGNIFICANT CHANGE UP (ref 8.4–10.5)

## 2019-08-12 PROCEDURE — 11424 EXC H-F-NK-SP B9+MARG 3.1-4: CPT | Mod: 59

## 2019-08-12 PROCEDURE — 60260 REPEAT THYROID SURGERY: CPT

## 2019-08-12 PROCEDURE — 60260 REPEAT THYROID SURGERY: CPT | Mod: AS

## 2019-08-12 PROCEDURE — 13132 CMPLX RPR F/C/C/M/N/AX/G/H/F: CPT | Mod: 59

## 2019-08-12 PROCEDURE — 88304 TISSUE EXAM BY PATHOLOGIST: CPT | Mod: 26

## 2019-08-12 PROCEDURE — 88307 TISSUE EXAM BY PATHOLOGIST: CPT | Mod: 26

## 2019-08-12 RX ORDER — CALCIUM CARBONATE 500(1250)
1 TABLET ORAL
Qty: 0 | Refills: 0 | DISCHARGE
Start: 2019-08-12

## 2019-08-12 RX ORDER — METHIMAZOLE 10 MG/1
1 TABLET ORAL
Qty: 0 | Refills: 0 | DISCHARGE

## 2019-08-12 RX ORDER — ACETAMINOPHEN 500 MG
2 TABLET ORAL
Qty: 0 | Refills: 0 | DISCHARGE
Start: 2019-08-12

## 2019-08-12 RX ORDER — FENTANYL CITRATE 50 UG/ML
25 INJECTION INTRAVENOUS
Refills: 0 | Status: DISCONTINUED | OUTPATIENT
Start: 2019-08-12 | End: 2019-08-13

## 2019-08-12 RX ORDER — SODIUM CHLORIDE 9 MG/ML
3 INJECTION INTRAMUSCULAR; INTRAVENOUS; SUBCUTANEOUS ONCE
Refills: 0 | Status: COMPLETED | OUTPATIENT
Start: 2019-08-12 | End: 2019-08-12

## 2019-08-12 RX ORDER — BENZOCAINE AND MENTHOL 5; 1 G/100ML; G/100ML
1 LIQUID ORAL
Refills: 0 | Status: DISCONTINUED | OUTPATIENT
Start: 2019-08-12 | End: 2019-08-13

## 2019-08-12 RX ORDER — CALCIUM GLUCONATE 100 MG/ML
1 VIAL (ML) INTRAVENOUS ONCE
Refills: 0 | Status: COMPLETED | OUTPATIENT
Start: 2019-08-12 | End: 2019-08-12

## 2019-08-12 RX ORDER — ACETAMINOPHEN 500 MG
650 TABLET ORAL EVERY 6 HOURS
Refills: 0 | Status: DISCONTINUED | OUTPATIENT
Start: 2019-08-12 | End: 2019-08-13

## 2019-08-12 RX ORDER — CALCIUM CARBONATE 500(1250)
1 TABLET ORAL
Refills: 0 | Status: DISCONTINUED | OUTPATIENT
Start: 2019-08-12 | End: 2019-08-13

## 2019-08-12 RX ADMIN — Medication 1 TABLET(S): at 11:57

## 2019-08-12 RX ADMIN — SODIUM CHLORIDE 3 MILLILITER(S): 9 INJECTION INTRAMUSCULAR; INTRAVENOUS; SUBCUTANEOUS at 12:10

## 2019-08-12 RX ADMIN — FENTANYL CITRATE 25 MICROGRAM(S): 50 INJECTION INTRAVENOUS at 10:02

## 2019-08-12 RX ADMIN — Medication 200 GRAM(S): at 09:28

## 2019-08-12 RX ADMIN — FENTANYL CITRATE 25 MICROGRAM(S): 50 INJECTION INTRAVENOUS at 10:43

## 2019-08-12 RX ADMIN — FENTANYL CITRATE 25 MICROGRAM(S): 50 INJECTION INTRAVENOUS at 13:30

## 2019-08-12 RX ADMIN — Medication 650 MILLIGRAM(S): at 14:23

## 2019-08-12 RX ADMIN — SODIUM CHLORIDE 50 MILLILITER(S): 9 INJECTION, SOLUTION INTRAVENOUS at 12:02

## 2019-08-12 RX ADMIN — FENTANYL CITRATE 25 MICROGRAM(S): 50 INJECTION INTRAVENOUS at 09:45

## 2019-08-12 NOTE — ASU DISCHARGE PLAN (ADULT/PEDIATRIC) - NURSING INSTRUCTIONS
You were given 1000mg IV Tylenol for pain management.  Please DO NOT take any Tylenol containing products, such as  Vicodin, Percocet, Excedrin, many cold preparations for the next 6 hours (until 215p).  DO NOT EXCEED 3000MG OF TYLENOL OVER 24 HOURS.

## 2019-08-12 NOTE — ASU DISCHARGE PLAN (ADULT/PEDIATRIC) - ASU DC SPECIAL INSTRUCTIONSFT
come to dr durán's office 8/13 for drain removal at previously scheduled time come to dr durán's office 8/13 for drain removal at previously scheduled time      After showering pat dry steri strips.  Do Not rub them.  They will curl up and fall off by themselves within 7 days.

## 2019-08-12 NOTE — ASU PATIENT PROFILE, ADULT - PMH
Anemia    Breast Cancer  Left Breast  tx surgery , rt and tamoxifen  Gout  2018  H/O hyperthyroidism    H/O thyrotoxicosis    History of blood transfusion  6/2019, "i may have hemachromatosis"  HTN (hypertension)    Hyperlipidemia    Iron excess    MDS (myelodysplastic syndrome)    Vertigo  last summer 2018

## 2019-08-12 NOTE — ASU DISCHARGE PLAN (ADULT/PEDIATRIC) - CARE PROVIDER_API CALL
Kash Melendez)  Plastic Surgery; Surgery  [address], [address]  [place] [address]  Phone: [phone]  Fax: [phone]  Follow Up Time:

## 2019-08-13 ENCOUNTER — APPOINTMENT (OUTPATIENT)
Dept: SURGERY | Facility: CLINIC | Age: 84
End: 2019-08-13
Payer: MEDICARE

## 2019-08-13 PROCEDURE — 99024 POSTOP FOLLOW-UP VISIT: CPT

## 2019-08-13 NOTE — ASSESSMENT
[FreeTextEntry1] : s/p completion thyroidectomy\par drain removed\par f/u Dr Allison next week\par F/U 4 week\par call next week for path report

## 2019-08-13 NOTE — PHYSICAL EXAM
[de-identified] : minimal postop swelling [Midline] : located in midline position [Normal] : orientation to person, place, and time: normal

## 2019-08-15 LAB — SURGICAL PATHOLOGY STUDY: SIGNIFICANT CHANGE UP

## 2019-08-19 ENCOUNTER — OTHER (OUTPATIENT)
Age: 84
End: 2019-08-19

## 2019-10-17 ENCOUNTER — APPOINTMENT (OUTPATIENT)
Dept: SURGERY | Facility: CLINIC | Age: 84
End: 2019-10-17
Payer: MEDICARE

## 2019-10-17 DIAGNOSIS — E05.00 THYROTOXICOSIS WITH DIFFUSE GOITER W/OUT THYROTOXIC CRISIS OR STORM: ICD-10-CM

## 2019-10-17 PROCEDURE — 99024 POSTOP FOLLOW-UP VISIT: CPT

## 2019-10-17 NOTE — ASSESSMENT
[FreeTextEntry1] : s/p completion thyroidectomy\par daily care\par bloods and scans per Dr Allison\par f/u 6 months

## 2019-10-17 NOTE — PHYSICAL EXAM
[de-identified] : well healed scar [Midline] : located in midline position [Normal] : orientation to person, place, and time: normal

## 2019-10-17 NOTE — HISTORY OF PRESENT ILLNESS
[de-identified] : Pt 2 months s/p completion thyroidectomy doing well without complaints Dr Allison has decreased synthroid to 88 mcg

## 2020-01-18 NOTE — H&P PST ADULT - NSANTHTOTALSCORECAL_ENT_A_CORE
cont rx cont rx  COMMODORE YUE [facility] P [phone]  1939 DOA 2020  DR ISREAL CASTELAN  ALLERGY Shellfish  CONTACT Sp Mercades C                    REVIEW OF SYMPTOMS      Able to give ROS  Yes     RELIABLE No   CONSTITUTIONAL Weakness Yes  Chills No Vision changes No  ENDOCRINE No unexplained hair loss No heat or cold intolerance    ALLERGY No hives  Sore throat No   RESP Coughing blood no  Shortness of breath YES   NEURO No Headache  Confusion Pain neck No   CARDIAC No Chest pain No Palpitations   GI No Pain abdomen NO   Vomiting NO     PHYSICAL EXAM    HEENT Unremarkable PERRLA atraumatic   RESP Fair air entry EXP prolonged    Harsh breath sound Resp distres mild   CARDIAC S1 S2 No S3     NO JVD    ABDOMEN SOFT BS PRESENT NOT DISTENDED No hepatosplenomegaly PEDAL EDEMA present No calf tenderness  NO rash   GENERAL Not TOXIC looking    VITALS/LABS       2020 afeb 84 120/70   2020 W 8.3 Hb 10.8 Plt 286 Na 141 K 4.6 CO2 31 Cr 1     PT DATA/BEST PRACTICE  # ALLERGY shellfish   ADVANCED DIRECTIVE       Goals of care discussion  WT  97 (2020)    BMI  30 (2020)                                                                                                           HEAD OF BED ELEVATION Yes  DYSPHAGIA EVAL   # DIET  dash ()      # IV F                                    # DVT PROPHYLAXIS   hpsc ()        2020 V duplex n   # ABIO                                   STRESS ULCER PROPHYLAXIS              protonix 40 )   INFECTION PPLX  ECHO                3/12/2019 ef 55% dd1 interatrial septum aneurysm  TR 2020 Tr 1 .071   GLYCEMIC CONTROL  iss ()   PROCEDURE    # MICROBIO  2020 pc n                                                                           PATIENT DATA ASSESSMENT PLAN                                  RESP GAS EXCHANGE   2020 1p 4l 735/58/181   A/R Cont noct bpap and may use duirng day as needed  BACTERIAL RESP TRACT INFECTION FELT TO BE UNLIKELY   2020 CXr no infiltr   -2020 W 7.4 - 8.3   A/R Bacterial infection unlikely   defer abio  LACTICEMIA RESOLVED   -2020 [place] 3.4 -1.5   Was likely sec to wob  COPD ex   Former smoker On home O2 Home noct bpap   Montelukast 10 ()   duoneb () symbicort 160 () pred 10 ()   BD steroids   uUnder conrol cont rx    VTE FELT TO BE UNLIKELY  2020 D-dimer 159   2020  v duplex n  A/R VTE unlikely   CAD DEMAND ISCHEMIA  -2020 Tr 1 n-.071   PMH CAD with 3v CABG 2004, Stent 2019 HLD  ASA 81 () Plavix 75 ()  atorvastat 40 () metorpolol 12.5x2 ()  Felt to have troponinemia sec rate related demand ischemia    CHF   2020 bnp 98   10/26/2018 ECHO ef 55%  Seems compensated   TACHARRHYTMIA   HO SVT 10/2019   metorpolol 12.5x2 ()   2020 Seen by Cardio To Rx with metoprolol  Under conytrtol      DISPOSITION/ PLAN    82 m former smoker PMH DM2, COPD (2L home/ BIPAP at night), CAD with 3v CABG , Stent 2019 cardiac arrest (2018) with ho recurrent admissions GOLD D HO  SVT 10/2019  was admitted 2020 with  chest pain and   and  sob in ER and was placed on BPAP    COPD ex BD steroids May dc on usual meds   TACHYARRHYTHMIA Metoprolol monitor   CAD MI unlikely   DC planning when cleared by Cardio       TIME SPENT Over 25 minutes aggregate care time spent on encounter; activities included   direct pt care, counseling and/or coordinating care reviewing notes, lab data/ imaging , discussion with multidisciplinary team/ pt /family. Risks, benefits, alternatives  discussed in detail. 2

## 2020-04-15 ENCOUNTER — APPOINTMENT (OUTPATIENT)
Dept: SURGERY | Facility: CLINIC | Age: 85
End: 2020-04-15

## 2020-06-16 NOTE — ASU PATIENT PROFILE, ADULT - AS SC BRADEN SENSORY
Met with pt and pt's  at bedside to discuss DC planning. Presented pt's  with list of accepting [facility] facilities. Pt's  to review providers and contact SW with his preferred facility. Discussed anticipated discharge today.   Social wor (4) no impairment

## 2020-07-29 NOTE — ASU PATIENT PROFILE, ADULT - PAIN CHRONIC, PROFILE
"Answer is \"yes\" to both her questions.  Let me know when she shows up so I can put the order in for Toradol.  " no

## 2021-11-28 NOTE — ASU DISCHARGE PLAN (ADULT/PEDIATRIC) - D. IF YOU HAD ANY TYPE OF SEDATION, YOU MAY EXPERIENCE LIGHTHEADEDNESS, DIZZINESS, OR SLEEPINESS FOLLOWING YOUR PROCEDURE. A RESPONSIBLE ADULT SHOULD STAY WITH YOU FOR AT LEAST 24 HOURS FOLLOWING YOUR PROCEDURE.
Take ibuprofen 600 mg 3 times a day with or without Tylenol 650 mg 3 times a day as needed for pain.  Follow-up with occupational health for further care including work restrictions.  Return to ER in case of any worsening symptoms including having any new headache, upper or lower extremity weakness, numbness, chest pain.  
Statement Selected

## 2022-03-08 NOTE — H&P PST ADULT - PRIMARY CARE PROVIDER
-- DO NOT REPLY / DO NOT REPLY ALL --  -- Message is from the [facility]--    General Patient Message      Reason for Call: Patient says that she is extremely frustrated regarding a billing error. Patient has left several messages asking for a call back at [phone] and still no response. Patient would like a call back as soon as possible regarding this matter.    Caller Information       Type Contact Phone    03/07/2022 06:07 PM CST Phone (Incoming) Natasha Byrd (Self) [phone] (M)          Alternative phone number: none        Did the caller agree that this message can wait until the office reopens in the morning? YES - The Message Can Wait      Send a message to the provider’s clinical support pool.     Turnaround time given to caller:   \"This message will be sent to [state Provider's name]. The clinical team will fulfill your request as soon as they review your message when the office opens tomorrow.\"         Dr Ge Hayes [phone]

## 2022-08-23 NOTE — ASU PATIENT PROFILE, ADULT - ALCOHOL USE HISTORY SINGLE SELECT
Have Your Skin Lesions Been Treated?: not been treated Is This A New Presentation, Or A Follow-Up?: Skin Lesions How Severe Is Your Skin Lesion?: mild never yes...

## 2023-02-09 NOTE — ED ADULT NURSE NOTE - CHIEF COMPLAINT
The patient is a 83y Female complaining of abnormal lab result. Albendazole Pregnancy And Lactation Text: This medication is Pregnancy Category C and it isn't known if it is safe during pregnancy. It is also excreted in breast milk.

## 2023-02-21 ENCOUNTER — NON-APPOINTMENT (OUTPATIENT)
Age: 88
End: 2023-02-21

## 2023-04-19 ENCOUNTER — NON-APPOINTMENT (OUTPATIENT)
Age: 88
End: 2023-04-19

## 2023-04-19 ENCOUNTER — APPOINTMENT (OUTPATIENT)
Dept: CARDIOLOGY | Facility: CLINIC | Age: 88
End: 2023-04-19
Payer: MEDICARE

## 2023-04-19 VITALS
BODY MASS INDEX: 27.23 KG/M2 | OXYGEN SATURATION: 97 % | HEART RATE: 76 BPM | DIASTOLIC BLOOD PRESSURE: 75 MMHG | HEIGHT: 62 IN | SYSTOLIC BLOOD PRESSURE: 155 MMHG | WEIGHT: 148 LBS

## 2023-04-19 DIAGNOSIS — Z01.810 ENCOUNTER FOR PREPROCEDURAL CARDIOVASCULAR EXAMINATION: ICD-10-CM

## 2023-04-19 PROCEDURE — 93000 ELECTROCARDIOGRAM COMPLETE: CPT

## 2023-04-19 PROCEDURE — 99215 OFFICE O/P EST HI 40 MIN: CPT

## 2023-04-19 RX ORDER — DEFERASIROX 360 MG/1
360 TABLET, FILM COATED ORAL DAILY
Refills: 0 | Status: DISCONTINUED | COMMUNITY
End: 2023-04-19

## 2023-04-19 RX ORDER — DARBEPOETIN ALFA 200 UG/.4ML
200 INJECTION, SOLUTION INTRAVENOUS; SUBCUTANEOUS
Refills: 0 | Status: DISCONTINUED | COMMUNITY
End: 2023-04-19

## 2023-12-12 NOTE — ED PROVIDER NOTE - CPE EDP RESP NORM
[facility]      Last Written Prescription Date:  9.13.23  Last Fill Quantity: #36,   # refills: 0  Last Office Visit: 11.15.23  Future Office visit:    Next 5 appointments (look out 90 days)      Feb 28, 2024  8:15 AM  (Arrive by 8:00 AM)  [facility]YSICAL with [facility]ame [facility]  [facility] [address] [address]  [phone]             Routing refill request to provider for review/approval because:  Drug not on the [facility], [facility] or [facility] refill protocol or controlled substance    
normal...

## 2024-02-27 ENCOUNTER — INPATIENT (INPATIENT)
Facility: HOSPITAL | Age: 89
LOS: 1 days | Discharge: HOME | DRG: 683 | End: 2024-02-29
Attending: STUDENT IN AN ORGANIZED HEALTH CARE EDUCATION/TRAINING PROGRAM | Admitting: INTERNAL MEDICINE
Payer: MEDICARE

## 2024-02-27 VITALS
DIASTOLIC BLOOD PRESSURE: 65 MMHG | HEART RATE: 86 BPM | SYSTOLIC BLOOD PRESSURE: 135 MMHG | RESPIRATION RATE: 16 BRPM | OXYGEN SATURATION: 96 % | TEMPERATURE: 98 F

## 2024-02-27 DIAGNOSIS — I10 ESSENTIAL (PRIMARY) HYPERTENSION: ICD-10-CM

## 2024-02-27 DIAGNOSIS — E87.5 HYPERKALEMIA: ICD-10-CM

## 2024-02-27 DIAGNOSIS — D64.9 ANEMIA, UNSPECIFIED: ICD-10-CM

## 2024-02-27 DIAGNOSIS — Z98.890 OTHER SPECIFIED POSTPROCEDURAL STATES: Chronic | ICD-10-CM

## 2024-02-27 DIAGNOSIS — E78.5 HYPERLIPIDEMIA, UNSPECIFIED: ICD-10-CM

## 2024-02-27 DIAGNOSIS — Z29.9 ENCOUNTER FOR PROPHYLACTIC MEASURES, UNSPECIFIED: ICD-10-CM

## 2024-02-27 DIAGNOSIS — D64.3 OTHER SIDEROBLASTIC ANEMIAS: ICD-10-CM

## 2024-02-27 DIAGNOSIS — N39.0 URINARY TRACT INFECTION, SITE NOT SPECIFIED: ICD-10-CM

## 2024-02-27 DIAGNOSIS — N17.9 ACUTE KIDNEY FAILURE, UNSPECIFIED: ICD-10-CM

## 2024-02-27 LAB
ALBUMIN SERPL ELPH-MCNC: 3.2 G/DL — LOW (ref 3.3–5)
ALP SERPL-CCNC: 55 U/L — SIGNIFICANT CHANGE UP (ref 40–120)
ALT FLD-CCNC: 15 U/L — SIGNIFICANT CHANGE UP (ref 12–78)
ANION GAP SERPL CALC-SCNC: 7 MMOL/L — SIGNIFICANT CHANGE UP (ref 5–17)
APPEARANCE UR: CLEAR — SIGNIFICANT CHANGE UP
APTT BLD: 28.6 SEC — SIGNIFICANT CHANGE UP (ref 24.5–35.6)
AST SERPL-CCNC: 17 U/L — SIGNIFICANT CHANGE UP (ref 15–37)
BASOPHILS # BLD AUTO: 0.05 K/UL — SIGNIFICANT CHANGE UP (ref 0–0.2)
BASOPHILS NFR BLD AUTO: 0.6 % — SIGNIFICANT CHANGE UP (ref 0–2)
BILIRUB SERPL-MCNC: 0.4 MG/DL — SIGNIFICANT CHANGE UP (ref 0.2–1.2)
BILIRUB UR-MCNC: NEGATIVE — SIGNIFICANT CHANGE UP
BUN SERPL-MCNC: 65 MG/DL — HIGH (ref 7–23)
CALCIUM SERPL-MCNC: 8.6 MG/DL — SIGNIFICANT CHANGE UP (ref 8.5–10.1)
CHLORIDE SERPL-SCNC: 113 MMOL/L — HIGH (ref 96–108)
CO2 SERPL-SCNC: 18 MMOL/L — LOW (ref 22–31)
COLOR SPEC: YELLOW — SIGNIFICANT CHANGE UP
CREAT SERPL-MCNC: 4.1 MG/DL — HIGH (ref 0.5–1.3)
DIFF PNL FLD: NEGATIVE — SIGNIFICANT CHANGE UP
EGFR: 10 ML/MIN/1.73M2 — LOW
EOSINOPHIL # BLD AUTO: 0.71 K/UL — HIGH (ref 0–0.5)
EOSINOPHIL NFR BLD AUTO: 8.7 % — HIGH (ref 0–6)
FLUAV AG NPH QL: SIGNIFICANT CHANGE UP
FLUBV AG NPH QL: SIGNIFICANT CHANGE UP
GLUCOSE SERPL-MCNC: 105 MG/DL — HIGH (ref 70–99)
GLUCOSE UR QL: NEGATIVE MG/DL — SIGNIFICANT CHANGE UP
HCT VFR BLD CALC: 24.7 % — LOW (ref 34.5–45)
HGB BLD-MCNC: 7.6 G/DL — LOW (ref 11.5–15.5)
IMM GRANULOCYTES NFR BLD AUTO: 1 % — HIGH (ref 0–0.9)
INR BLD: 0.94 RATIO — SIGNIFICANT CHANGE UP (ref 0.85–1.18)
KETONES UR-MCNC: NEGATIVE MG/DL — SIGNIFICANT CHANGE UP
LACTATE SERPL-SCNC: 0.6 MMOL/L — LOW (ref 0.7–2)
LEUKOCYTE ESTERASE UR-ACNC: ABNORMAL
LYMPHOCYTES # BLD AUTO: 0.97 K/UL — LOW (ref 1–3.3)
LYMPHOCYTES # BLD AUTO: 11.9 % — LOW (ref 13–44)
MCHC RBC-ENTMCNC: 30.5 PG — SIGNIFICANT CHANGE UP (ref 27–34)
MCHC RBC-ENTMCNC: 30.8 GM/DL — LOW (ref 32–36)
MCV RBC AUTO: 99.2 FL — SIGNIFICANT CHANGE UP (ref 80–100)
MONOCYTES # BLD AUTO: 1.06 K/UL — HIGH (ref 0–0.9)
MONOCYTES NFR BLD AUTO: 13 % — SIGNIFICANT CHANGE UP (ref 2–14)
NEUTROPHILS # BLD AUTO: 5.29 K/UL — SIGNIFICANT CHANGE UP (ref 1.8–7.4)
NEUTROPHILS NFR BLD AUTO: 64.8 % — SIGNIFICANT CHANGE UP (ref 43–77)
NITRITE UR-MCNC: NEGATIVE — SIGNIFICANT CHANGE UP
NRBC # BLD: 0 /100 WBCS — SIGNIFICANT CHANGE UP (ref 0–0)
PH UR: 5 — SIGNIFICANT CHANGE UP (ref 5–8)
PLATELET # BLD AUTO: 203 K/UL — SIGNIFICANT CHANGE UP (ref 150–400)
POTASSIUM SERPL-MCNC: 5.7 MMOL/L — HIGH (ref 3.5–5.3)
POTASSIUM SERPL-SCNC: 5.7 MMOL/L — HIGH (ref 3.5–5.3)
PROCALCITONIN SERPL-MCNC: 2.71 NG/ML — HIGH
PROT SERPL-MCNC: 6.6 G/DL — SIGNIFICANT CHANGE UP (ref 6–8.3)
PROT UR-MCNC: SIGNIFICANT CHANGE UP MG/DL
PROTHROM AB SERPL-ACNC: 11 SEC — SIGNIFICANT CHANGE UP (ref 9.5–13)
RBC # BLD: 2.49 M/UL — LOW (ref 3.8–5.2)
RBC # FLD: 26.3 % — HIGH (ref 10.3–14.5)
RSV RNA NPH QL NAA+NON-PROBE: SIGNIFICANT CHANGE UP
SARS-COV-2 RNA SPEC QL NAA+PROBE: SIGNIFICANT CHANGE UP
SODIUM SERPL-SCNC: 138 MMOL/L — SIGNIFICANT CHANGE UP (ref 135–145)
SP GR SPEC: 1.01 — SIGNIFICANT CHANGE UP (ref 1–1.03)
UROBILINOGEN FLD QL: 0.2 MG/DL — SIGNIFICANT CHANGE UP (ref 0.2–1)
WBC # BLD: 8.16 K/UL — SIGNIFICANT CHANGE UP (ref 3.8–10.5)
WBC # FLD AUTO: 8.16 K/UL — SIGNIFICANT CHANGE UP (ref 3.8–10.5)

## 2024-02-27 PROCEDURE — 99285 EMERGENCY DEPT VISIT HI MDM: CPT

## 2024-02-27 PROCEDURE — 71045 X-RAY EXAM CHEST 1 VIEW: CPT | Mod: 26

## 2024-02-27 PROCEDURE — 99223 1ST HOSP IP/OBS HIGH 75: CPT | Mod: GC

## 2024-02-27 RX ORDER — ACETAMINOPHEN 500 MG
650 TABLET ORAL EVERY 6 HOURS
Refills: 0 | Status: DISCONTINUED | OUTPATIENT
Start: 2024-02-27 | End: 2024-02-29

## 2024-02-27 RX ORDER — DARBEPOETIN ALFA IN POLYSORBAT 200MCG/0.4
0 PEN INJECTOR (ML) SUBCUTANEOUS
Qty: 0 | Refills: 0 | DISCHARGE

## 2024-02-27 RX ORDER — LEVOTHYROXINE SODIUM 125 MCG
75 TABLET ORAL DAILY
Refills: 0 | Status: DISCONTINUED | OUTPATIENT
Start: 2024-02-27 | End: 2024-02-29

## 2024-02-27 RX ORDER — SENNA PLUS 8.6 MG/1
2 TABLET ORAL AT BEDTIME
Refills: 0 | Status: DISCONTINUED | OUTPATIENT
Start: 2024-02-27 | End: 2024-02-29

## 2024-02-27 RX ORDER — HEPARIN SODIUM 5000 [USP'U]/ML
5000 INJECTION INTRAVENOUS; SUBCUTANEOUS EVERY 12 HOURS
Refills: 0 | Status: DISCONTINUED | OUTPATIENT
Start: 2024-02-27 | End: 2024-02-29

## 2024-02-27 RX ORDER — MECLIZINE HCL 12.5 MG
2.5 TABLET ORAL
Qty: 0 | Refills: 0 | DISCHARGE

## 2024-02-27 RX ORDER — SIMVASTATIN 20 MG/1
1 TABLET, FILM COATED ORAL
Qty: 0 | Refills: 0 | DISCHARGE

## 2024-02-27 RX ORDER — LEVOTHYROXINE SODIUM 125 MCG
1 TABLET ORAL
Refills: 0 | DISCHARGE

## 2024-02-27 RX ORDER — CHOLECALCIFEROL (VITAMIN D3) 125 MCG
2000 CAPSULE ORAL DAILY
Refills: 0 | Status: DISCONTINUED | OUTPATIENT
Start: 2024-02-27 | End: 2024-02-29

## 2024-02-27 RX ORDER — LANOLIN ALCOHOL/MO/W.PET/CERES
3 CREAM (GRAM) TOPICAL AT BEDTIME
Refills: 0 | Status: DISCONTINUED | OUTPATIENT
Start: 2024-02-27 | End: 2024-02-29

## 2024-02-27 RX ORDER — SODIUM CHLORIDE 9 MG/ML
1000 INJECTION, SOLUTION INTRAVENOUS
Refills: 0 | Status: DISCONTINUED | OUTPATIENT
Start: 2024-02-27 | End: 2024-02-29

## 2024-02-27 RX ORDER — ATENOLOL 25 MG/1
1 TABLET ORAL
Qty: 0 | Refills: 0 | DISCHARGE

## 2024-02-27 RX ORDER — SODIUM CHLORIDE 9 MG/ML
1000 INJECTION, SOLUTION INTRAVENOUS ONCE
Refills: 0 | Status: COMPLETED | OUTPATIENT
Start: 2024-02-27 | End: 2024-02-27

## 2024-02-27 RX ORDER — SODIUM ZIRCONIUM CYCLOSILICATE 10 G/10G
10 POWDER, FOR SUSPENSION ORAL ONCE
Refills: 0 | Status: COMPLETED | OUTPATIENT
Start: 2024-02-27 | End: 2024-02-27

## 2024-02-27 RX ORDER — DEFERASIROX 180 MG/1
0 GRANULE ORAL
Qty: 0 | Refills: 0 | DISCHARGE

## 2024-02-27 RX ORDER — SIMVASTATIN 20 MG/1
20 TABLET, FILM COATED ORAL AT BEDTIME
Refills: 0 | Status: DISCONTINUED | OUTPATIENT
Start: 2024-02-27 | End: 2024-02-29

## 2024-02-27 RX ORDER — HYDROXYZINE HCL 10 MG
1 TABLET ORAL
Qty: 0 | Refills: 0 | DISCHARGE

## 2024-02-27 RX ORDER — CEFTRIAXONE 500 MG/1
1000 INJECTION, POWDER, FOR SOLUTION INTRAMUSCULAR; INTRAVENOUS EVERY 24 HOURS
Refills: 0 | Status: DISCONTINUED | OUTPATIENT
Start: 2024-02-27 | End: 2024-02-29

## 2024-02-27 RX ORDER — SODIUM CHLORIDE 9 MG/ML
1000 INJECTION, SOLUTION INTRAVENOUS
Refills: 0 | Status: DISCONTINUED | OUTPATIENT
Start: 2024-02-27 | End: 2024-02-27

## 2024-02-27 RX ORDER — ONDANSETRON 8 MG/1
4 TABLET, FILM COATED ORAL EVERY 8 HOURS
Refills: 0 | Status: DISCONTINUED | OUTPATIENT
Start: 2024-02-27 | End: 2024-02-29

## 2024-02-27 RX ORDER — AMLODIPINE BESYLATE 2.5 MG/1
1 TABLET ORAL
Qty: 0 | Refills: 0 | DISCHARGE

## 2024-02-27 RX ADMIN — SENNA PLUS 2 TABLET(S): 8.6 TABLET ORAL at 21:23

## 2024-02-27 RX ADMIN — SODIUM ZIRCONIUM CYCLOSILICATE 10 GRAM(S): 10 POWDER, FOR SUSPENSION ORAL at 21:22

## 2024-02-27 RX ADMIN — SODIUM CHLORIDE 1000 MILLILITER(S): 9 INJECTION, SOLUTION INTRAVENOUS at 23:40

## 2024-02-27 RX ADMIN — SODIUM CHLORIDE 1000 MILLILITER(S): 9 INJECTION, SOLUTION INTRAVENOUS at 17:05

## 2024-02-27 RX ADMIN — SIMVASTATIN 20 MILLIGRAM(S): 20 TABLET, FILM COATED ORAL at 21:23

## 2024-02-27 NOTE — H&P ADULT - PROBLEM SELECTOR PLAN 2
Elevated procal at 2.71, lactate WNL  - UA w/ moderate LEC, 20 WBC, few bacteria  - Currently   - UCx ordered, f/u results Pt w/ recent UTI and UCx as outpatient, "not impressive" per pt  - Elevated procal at 2.71, lactate WNL  - UA w/ moderate LEC, 20 WBC, few bacteria  - Pt still with increased urgency  - UCx ordered, f/u results Pt w/ recent UTI and UCx as outpatient, "not impressive" per pt  - Elevated procal at 2.71, lactate WNL  - WBC 8.85, but apparently were 21.00 as an outpatient  - Will repeat CBC @ 23:00  - UA w/ moderate LEC, 20 WBC, few bacteria  - Pt still with increased urgency  - Rochepin 1g IV for 3d   - UCx ordered, f/u results  - F/u AM CBC  - ID (Dr. Olvera) consulted, recs appreciated Pt w/ recent UTI and UCx as outpatient, "not impressive" per pt  - Elevated procal at 2.71, lactate WNL  - WBC 8.85, but apparently were 21.00 as an outpatient just yesterday  - Will repeat CBC @ 23:00  - UA w/ moderate LEC, 20 WBC, few bacteria  - Pt still with increased urgency  - Rochepin 1g IV for 3d   - UCx ordered, f/u results  - F/u AM CBC  - ID (Dr. Olvera) consulted, recs appreciated

## 2024-02-27 NOTE — ED PROVIDER NOTE - CHIEF COMPLAINT
The patient is a 88y Female complaining of  The patient is a 88y Female complaining of weakness and fatigue

## 2024-02-27 NOTE — H&P ADULT - ASSESSMENT
Pt is an 89 yo F with PMHx  sideroblastic anemia breast cancer (s/p L lumpectomy, radiation and tamoxifen, thyrotoxicosis s/p thyroidectomy, HTN, HLD, MDS presents to the ED with weakness and fatigue and sent to ED by primary for abnormal labs, admitted for JODI.

## 2024-02-27 NOTE — ED ADULT TRIAGE NOTE - CHIEF COMPLAINT QUOTE
C/O Abnormal blood result( elevated white count and low hgb) sent by PMD, had blood test done yesterday , with recent Diagnosis of uti and finished 1 round of ABX

## 2024-02-27 NOTE — ED PROVIDER NOTE - CLINICAL SUMMARY MEDICAL DECISION MAKING FREE TEXT BOX
Weakness fatigue malaise and patient with sideroblastic anemia and recent UTI now here with worsening creatinine.

## 2024-02-27 NOTE — ED ADULT NURSE NOTE - NSFALLRISKINTERV_ED_ALL_ED
Assistance OOB with selected safe patient handling equipment if applicable/Assistance with ambulation/Communicate fall risk and risk factors to all staff, patient, and family/Monitor gait and stability/Provide visual cue: yellow wristband, yellow gown, etc/Reinforce activity limits and safety measures with patient and family/Call bell, personal items and telephone in reach/Instruct patient to call for assistance before getting out of bed/chair/stretcher/Non-slip footwear applied when patient is off stretcher/[place] to call system/Physically safe environment - no spills, clutter or unnecessary equipment/Purposeful Proactive Rounding/Room/bathroom lighting operational, light cord in reach

## 2024-02-27 NOTE — ED ADULT NURSE REASSESSMENT NOTE - NS ED NURSE REASSESS COMMENT FT1
1605:  patient presents to the er with abnormal result of labs, as she was notified by her physician.  she was told her WBC is increasing and her HGB is decreasing.  she has minimal complaints at this time, other than some lethargy.  she was recently treated for a UTI with a full course of bactrim.  she was then given Macrobid by Urgent care, but this was d/c by her pmd due to nauseas after first dose, and the patient was told that her culture is basically negative.  the patient states she has some occasional sob, but denies any chest pain, denies any abdominal pain / n/v/d.  she is noted to have some swelling of the lower extremities.  she is alert / oriented x4.  family at bedside.  at md's request, a bladder scan was performed (50).  iv access obtained, labs collected / swab collected and sent.  urine to be collected.  tea burch.

## 2024-02-27 NOTE — ED PROVIDER NOTE - NSICDXPASTMEDICALHX_GEN_ALL_CORE_FT
PAST MEDICAL HISTORY:  Anemia     Breast Cancer Left Breast  tx surgery , rt and tamoxifen    Gout 2018    H/O hyperthyroidism     H/O thyrotoxicosis     History of blood transfusion 6/2019, "i may have hemachromatosis"    HTN (hypertension)     Hyperlipidemia     Iron excess     MDS (myelodysplastic syndrome)     Vertigo last summer 2018

## 2024-02-27 NOTE — ED ADULT NURSE NOTE - OBJECTIVE STATEMENT
the patient presents to the er at the request of her pmd, who took blood yesterday and called today to state her wbc is increasing and her hgb is dropping.  she was recently diagnosed with a UTI and treated with Bactrim, completed the full course.  Went to urgent care on Saturday, and they checked urine again, but because of the recent UTI, they thought they would give Macrobid just "to play it safe"  she took it, it made her nauseas, and she then went to her pmd, who told her culture from urgent care was negative and to stop the macrobid.  she complains of lethargy, but denies any urinary symptoms (she did have urgency).  she denies any bleeding.  she denies any abdominal pain, n/v/d.  she denies any known fevers.  she denies any chest pain.  states she sometimes feels sob, but this is not continuous and comes and goes for a while now.  family at bedside the patient presents to the er at the request of her pmd, who took blood yesterday and called today to state her wbc is increasing and her hgb is dropping.  she was recently diagnosed with a UTI and treated with Bactrim, completed the full course.  Went to urgent care on Saturday for continued lethargy, and they checked urine again, but because of the recent UTI, they thought they would give Macrobid just "to play it safe"  she took it, it made her nauseas, and she then went to her pmd, who told her culture from urgent care was negative and to stop the Macrobid.  she complains of lethargy, but denies any urinary symptoms (she did have urgency before the diagnosis).  she denies any bleeding.  she denies any abdominal pain, n/v/d.  she denies any known fevers.  she denies any chest pain.  states she sometimes feels sob, but this is not continuous and comes and goes for a while now. the patient is able to move all extremities.  she ambulated into the ed with the assistance of family who remain at bedside

## 2024-02-27 NOTE — H&P ADULT - ATTENDING COMMENTS
Pt is an 89 yo F with PMHx  sideroblastic anemia breast cancer (s/p L lumpectomy, radiation and tamoxifen, thyrotoxicosis s/p thyroidectomy, HTN, HLD, MDS presents to the ED with weakness and fatigue and sent to ED by primary for abnormal labs, admitted for JODI.     HPI as above.     T(C): 36.4 (02-27-24 @ 19:29), Max: 36.6 (02-27-24 @ 14:51)  HR: 82 (02-27-24 @ 19:29) (82 - 86)  BP: 135/78 (02-27-24 @ 19:29) (128/72 - 135/78)  RR: 18 (02-27-24 @ 19:29) (16 - 18)  SpO2: 92% (02-27-24 @ 19:29) (92% - 96%)  Wt(kg): --    Physical Exam:   GENERAL: well-groomed, well-developed, NAD  HEENT: head NC/AT; conjunctiva & sclera clear; hearing grossly intact, dry mucous membranes  NECK: supple, no JVD  RESPIRATORY: CTA B/L, no wheezing, rales, rhonchi or rubs  CARDIOVASCULAR: S1&S2, RRR, no murmurs or gallops  ABDOMEN: soft, non-tender, non-distended, + Bowel sounds x4 quadrants, no guarding, rebound or rigidity  MUSCULOSKELETAL:  no clubbing, cyanosis or edema of all 4 extremities  LYMPH: no cervical lymphadenopathy  VASCULAR: Radial pulses 2+ bilaterally, no varicose veins   SKIN: warm and dry, color normal  NEUROLOGIC: AA&O X3, MAEx4  Psych: Normal mood and affect, normal behavior    Plan:  JODI: f/u renal U/S.   -continue IVF  -monitor electrolytes  -lokelma 10 mgx1 dose due to high potassium  -f/u repeat K level  -monitor urine output  -monitor on tele  -will treat for UTI. Outpatient lab reviewed and patient had 21k WBC count yesterday  -Nephro and ID consulted  -updated patients daughter at bedside

## 2024-02-27 NOTE — H&P ADULT - NSHPPHYSICALEXAM_GEN_ALL_CORE
VITALS:   T(C): 36.4 (02-27-24 @ 18:28), Max: 36.6 (02-27-24 @ 14:51)  HR: 84 (02-27-24 @ 18:28) (84 - 86)  BP: 128/72 (02-27-24 @ 18:28) (128/72 - 135/65)  RR: 17 (02-27-24 @ 18:28) (16 - 17)  SpO2: 95% (02-27-24 @ 18:28) (95% - 96%)    GENERAL: NAD, lying in bed comfortably  HEAD:  Atraumatic, normocephalic  EYES: EOMI, PERRLA, conjunctiva and sclera clear  ENT: Moist mucous membranes  NECK: Supple, no JVD  HEART: Regular rate and rhythm, no murmurs, rubs, or gallops  LUNGS: Unlabored respirations.  Clear to auscultation bilaterally, no crackles, wheezing, or rhonchi  ABDOMEN: Soft, nontender, nondistended, +BS  EXTREMITIES: 2+ peripheral pulses bilaterally. No clubbing, cyanosis, or edema  NERVOUS SYSTEM:  A&Ox3, no focal deficits   SKIN: Warm and dry VITALS:   T(C): 36.4 (02-27-24 @ 18:28), Max: 36.6 (02-27-24 @ 14:51)  HR: 84 (02-27-24 @ 18:28) (84 - 86)  BP: 128/72 (02-27-24 @ 18:28) (128/72 - 135/65)  RR: 17 (02-27-24 @ 18:28) (16 - 17)  SpO2: 95% (02-27-24 @ 18:28) (95% - 96%)    GENERAL: NAD, lying in bed comfortably, non-toxic appearing  HEAD:  Atraumatic, normocephalic  EYES: EOMI, PERRLA, conjunctiva and sclera clear  ENT: Moist mucous membranes  NECK: Supple, no JVD  HEART: RRR, + S1/S1, systolic murmur at LSB  LUNGS: Unlabored respirations.  Clear to auscultation bilaterally, no crackles, wheezing  ABDOMEN: Soft, nontender, nondistended, +BS  EXTREMITIES: + Trace pitting edema b/l. 2+ peripheral pulses bilaterally.   NERVOUS SYSTEM:  A&Ox3, no focal deficits   SKIN: Warm and dry

## 2024-02-27 NOTE — PROGRESS NOTE ADULT - SUBJECTIVE AND OBJECTIVE BOX
JODI on CKDIII. IVF. Urine studies. Renal US. UA does not suggest GN. Will not send serologies at this time. Will not plan for kidney biopsy at this time

## 2024-02-27 NOTE — ED ADULT NURSE REASSESSMENT NOTE - NS ED NURSE REASSESS COMMENT FT1
1731:  iv infusing.  patient states it is comfortable.  playing games on laptop.  no distress noted.  awaiting bed assignment.  patient / family aware of the plan of care (admission) and concur.  tea burch.

## 2024-02-27 NOTE — H&P ADULT - PROBLEM SELECTOR PLAN 1
Pt found to have elevated Cr at outpt office (>3), baseline ~1.6, was 1.77 on 2/23  - Cr 4.1 on admission  - Can be medication induced vs prerenal 2/2 dehydration  - Admit to medicine w/ remote tele for further management and evaluation  - s/p LR bolus 1000cc x1, c/w @ 65cc/hr  - KUB US ordered, f/u results  - Avoid nephrotoxic agents  - Nephro (Dr. Gonzales) consulted, recs appreciated Pt found to have elevated Cr at outpt office (>3), baseline ~1.6, was 1.77 on 2/23  - Cr 4.1 on admission  - Can be medication induced vs prerenal 2/2 dehydration  - Admit to medicine w/ remote tele for further management and evaluation  - s/p LR bolus 1000cc x1, c/w @ 65cc/hr  - KUB US ordered, f/u results  - Avoid nephrotoxic agents  - F/u AM CMP  - Nephro (Dr. Gonzales) consulted, recs appreciated

## 2024-02-27 NOTE — H&P ADULT - PROBLEM SELECTOR PLAN 5
Chronic  - c/w home Chronic  - Pt recently taken off BP medications 2/2 low BP  - continue to monitor BP

## 2024-02-27 NOTE — H&P ADULT - NSHPREVIEWOFSYSTEMS_GEN_ALL_CORE
CONSTITUTIONAL: denies fever, chills, fatigue, weakness  HEENT: denies blurred vision, sore throat  SKIN: denies new lesions, rash  CARDIOVASCULAR: denies chest pain, chest pressure, palpitations  RESPIRATORY: denies shortness of breath, sputum production  GASTROINTESTINAL: denies nausea, vomiting, diarrhea, abdominal pain  GENITOURINARY: denies dysuria, discharge  NEUROLOGICAL: denies numbness, headache, focal weakness  MUSCULOSKELETAL: denies new joint pain, muscle aches  HEMATOLOGIC: denies gross bleeding, bruising  LYMPHATICS: denies enlarged lymph nodes, extremity swelling  PSYCHIATRIC: denies recent changes in anxiety, depression  ENDOCRINOLOGIC: denies sweating, cold or heat intolerance CONSTITUTIONAL: +fatigue, weakness (improving). denies fever, chills   HEENT: denies blurred vision, sore throat  SKIN: denies new lesions, rash  CARDIOVASCULAR: denies chest pain, chest pressure, palpitations  RESPIRATORY: denies shortness of breath, sputum production  GASTROINTESTINAL: denies nausea, vomiting, diarrhea, abdominal pain  GENITOURINARY: + frequency and urgency. denies dysuria, discharge  NEUROLOGICAL: denies numbness, headache, focal weakness  MUSCULOSKELETAL: denies new joint pain, muscle aches  HEMATOLOGIC: denies gross bleeding, bruising  LYMPHATICS: denies enlarged lymph nodes, extremity swelling  PSYCHIATRIC: denies recent changes in anxiety, depression  ENDOCRINOLOGIC: denies sweating, cold or heat intolerance

## 2024-02-27 NOTE — PATIENT PROFILE ADULT - FALL HARM RISK - RISK INTERVENTIONS

## 2024-02-27 NOTE — ED ADULT NURSE REASSESSMENT NOTE - NS ED NURSE REASSESS COMMENT FT1
1900:  patient taken up to the floor (234D) on portable monitor with transport and RN.  family at bedside.  the patient continues to feel good, no distress, no complaints.  with assistance, she was taken from ER stretcher and ambulated to hospital bed.  smiling and content / comfortable.  daughter remains at her bedside.  tea burch.

## 2024-02-27 NOTE — ED PROVIDER NOTE - DIFFERENTIAL DIAGNOSIS
Differential Diagnosis Differential diagnosis includes prerenal versus intrarenal versus postrenal azotemia most likely prerenal from dehydration.  Possible ATN though doubt possible underlying sepsis though doubt.

## 2024-02-27 NOTE — H&P ADULT - PROBLEM SELECTOR PLAN 4
- Potassium 5.7 on admission  - Lokelma ordered  - Remote telemetry  - f/u AM CMP - Potassium 5.7 on admission  - Lokelma 5mg ordered  - Remote telemetry to monitor for arrythmia  - f/u AM CMP - Potassium 5.7 on admission  - Lokelma 5mg ordered  - Remote telemetry to monitor for arrythmia  - f/u BMP at 23:00 and AM - Potassium 5.7 on admission  - Lokelma 10mg ordered  - Remote telemetry to monitor for arrythmia  - f/u BMP at 23:00 and AM

## 2024-02-27 NOTE — ED PROVIDER NOTE - OBJECTIVE STATEMENT
Patient is an 88-year-old female with history of sideroblastic anemia breast cancer status postsurgery radiation and tamoxifen hypertension hyperthyroidism hyperlipidemia and myelodysplastic syndrome who now presents to the emergency department at [facility] with weakness and fatigue.  Patient had urinary urgency and frequency a few weeks ago and was seen at a local urgent care center and diagnosed with a urinary tract infection.  Patient began and finished a course of Bactrim DS and symptoms resolved but then approximately 1 week later i.e. 1 week ago patient began feeling slightly fatigued and thus patient was reevaluated in the same urgent care center.  It was believed at that time that patient may have persistent UTI though the urine was unremarkable per patient but despite that was started on a course of Macrobid which she could not complete because she developed mild nausea temporarily related to Macrobid.  Over the past few days has had more weak feeling fatigued and generalized malaise and ultimately was seen by her healthcare provider who is an NP in the vicinity and sent off routine labs which showed some abnormalities and ultimately was sent here for further evaluation treatment and management.  Upon reviewing those labs I find that the white count has gradually increased with worsening premature granulocytes along with creatinine which has increased from a baseline of 1.6-1.7 to over 3.0.  Patient denies any headache chest pain abdominal pain back pain no fever no chills no vomiting no diarrhea no cough.  Slight shortness of breath. No recent travel.

## 2024-02-27 NOTE — ED PROVIDER NOTE - PHYSICAL EXAMINATION
Examination reveals a well-developed well-nourished pleasant white female lying comfortably on the stretcher in no acute distress.  Neck is supple no JVD no bruits no cervical adenopathy lungs are clear and symmetrical bilaterally heart S1-S2 with a grade 2/6 early systolic murmur heard best at the left lower sternal border.  Abdomen is soft flat nontender all 4 quadrants positive bowel sounds extremities revealed trace to 1+ pitting edema bilateral lower extremities.  Skin is warm and dry without rash.  Neurologically alert and oriented x 4 without any focal neurologic deficits.  Skin is warm and dry.

## 2024-02-27 NOTE — H&P ADULT - PROBLEM SELECTOR PLAN 3
Pt found to have low Hg of 7.6, baseline 8-9 per chart review  - Pt w/ no symptoms of dizziness, changes in vision, denies hematuria, bloody BM  - Iron studies ordered, f/u results Chronic, baseline 8-9 per chart review, follows Dr. Villa and receives procrit injections every Fri  - Hg 7.6 on admission  - Pt w/ no symptoms of dizziness, changes in vision, denies hematuria, bloody BM  - Iron studies ordered, f/u results  - F/u AM CBC Chronic, baseline 8-9 per chart review, follows Dr. Villa and receives procrit injections every Fri  - Hg 7.6 on admission  - Pt w/ no symptoms of dizziness, changes in vision, denies hematuria, bloody BM  - Iron studies ordered, f/u results  - F/u CBC @ 23:00 and AM

## 2024-02-28 LAB
ALBUMIN SERPL ELPH-MCNC: 3 G/DL — LOW (ref 3.3–5)
ALP SERPL-CCNC: 47 U/L — SIGNIFICANT CHANGE UP (ref 40–120)
ALT FLD-CCNC: 14 U/L — SIGNIFICANT CHANGE UP (ref 12–78)
ANION GAP SERPL CALC-SCNC: 6 MMOL/L — SIGNIFICANT CHANGE UP (ref 5–17)
ANION GAP SERPL CALC-SCNC: 7 MMOL/L — SIGNIFICANT CHANGE UP (ref 5–17)
AST SERPL-CCNC: 10 U/L — LOW (ref 15–37)
BASOPHILS # BLD AUTO: 0.06 K/UL — SIGNIFICANT CHANGE UP (ref 0–0.2)
BASOPHILS NFR BLD AUTO: 1 % — SIGNIFICANT CHANGE UP (ref 0–2)
BILIRUB SERPL-MCNC: 1.1 MG/DL — SIGNIFICANT CHANGE UP (ref 0.2–1.2)
BUN SERPL-MCNC: 60 MG/DL — HIGH (ref 7–23)
BUN SERPL-MCNC: 61 MG/DL — HIGH (ref 7–23)
CALCIUM SERPL-MCNC: 8.5 MG/DL — SIGNIFICANT CHANGE UP (ref 8.5–10.1)
CALCIUM SERPL-MCNC: 8.9 MG/DL — SIGNIFICANT CHANGE UP (ref 8.5–10.1)
CHLORIDE SERPL-SCNC: 118 MMOL/L — HIGH (ref 96–108)
CHLORIDE SERPL-SCNC: 120 MMOL/L — HIGH (ref 96–108)
CO2 SERPL-SCNC: 18 MMOL/L — LOW (ref 22–31)
CO2 SERPL-SCNC: 19 MMOL/L — LOW (ref 22–31)
CREAT SERPL-MCNC: 3.3 MG/DL — HIGH (ref 0.5–1.3)
CREAT SERPL-MCNC: 3.7 MG/DL — HIGH (ref 0.5–1.3)
CRP SERPL-MCNC: 101 MG/L — HIGH
CULTURE RESULTS: SIGNIFICANT CHANGE UP
EGFR: 11 ML/MIN/1.73M2 — LOW
EGFR: 13 ML/MIN/1.73M2 — LOW
EOSINOPHIL # BLD AUTO: 0.62 K/UL — HIGH (ref 0–0.5)
EOSINOPHIL NFR BLD AUTO: 9.9 % — HIGH (ref 0–6)
FERRITIN SERPL-MCNC: 974 NG/ML — HIGH (ref 13–330)
GLUCOSE SERPL-MCNC: 103 MG/DL — HIGH (ref 70–99)
GLUCOSE SERPL-MCNC: 91 MG/DL — SIGNIFICANT CHANGE UP (ref 70–99)
HCT VFR BLD CALC: 21 % — CRITICAL LOW (ref 34.5–45)
HCT VFR BLD CALC: 25.5 % — LOW (ref 34.5–45)
HGB BLD-MCNC: 6.6 G/DL — CRITICAL LOW (ref 11.5–15.5)
HGB BLD-MCNC: 8.1 G/DL — LOW (ref 11.5–15.5)
IMM GRANULOCYTES NFR BLD AUTO: 1 % — HIGH (ref 0–0.9)
IRON SATN MFR SERPL: 110 UG/DL — SIGNIFICANT CHANGE UP (ref 30–160)
IRON SATN MFR SERPL: 64 % — HIGH (ref 14–50)
LYMPHOCYTES # BLD AUTO: 1.07 K/UL — SIGNIFICANT CHANGE UP (ref 1–3.3)
LYMPHOCYTES # BLD AUTO: 17.1 % — SIGNIFICANT CHANGE UP (ref 13–44)
MAGNESIUM SERPL-MCNC: 2.2 MG/DL — SIGNIFICANT CHANGE UP (ref 1.6–2.6)
MAGNESIUM SERPL-MCNC: 2.3 MG/DL — SIGNIFICANT CHANGE UP (ref 1.6–2.6)
MCHC RBC-ENTMCNC: 30.2 PG — SIGNIFICANT CHANGE UP (ref 27–34)
MCHC RBC-ENTMCNC: 30.6 PG — SIGNIFICANT CHANGE UP (ref 27–34)
MCHC RBC-ENTMCNC: 31.4 GM/DL — LOW (ref 32–36)
MCHC RBC-ENTMCNC: 31.8 GM/DL — LOW (ref 32–36)
MCV RBC AUTO: 95.1 FL — SIGNIFICANT CHANGE UP (ref 80–100)
MCV RBC AUTO: 97.2 FL — SIGNIFICANT CHANGE UP (ref 80–100)
MONOCYTES # BLD AUTO: 0.88 K/UL — SIGNIFICANT CHANGE UP (ref 0–0.9)
MONOCYTES NFR BLD AUTO: 14.1 % — HIGH (ref 2–14)
NEUTROPHILS # BLD AUTO: 3.57 K/UL — SIGNIFICANT CHANGE UP (ref 1.8–7.4)
NEUTROPHILS NFR BLD AUTO: 56.9 % — SIGNIFICANT CHANGE UP (ref 43–77)
NRBC # BLD: 0 /100 WBCS — SIGNIFICANT CHANGE UP (ref 0–0)
NRBC # BLD: 0 /100 WBCS — SIGNIFICANT CHANGE UP (ref 0–0)
PHOSPHATE SERPL-MCNC: 3.5 MG/DL — SIGNIFICANT CHANGE UP (ref 2.5–4.5)
PHOSPHATE SERPL-MCNC: 3.7 MG/DL — SIGNIFICANT CHANGE UP (ref 2.5–4.5)
PLATELET # BLD AUTO: 217 K/UL — SIGNIFICANT CHANGE UP (ref 150–400)
PLATELET # BLD AUTO: 245 K/UL — SIGNIFICANT CHANGE UP (ref 150–400)
POTASSIUM SERPL-MCNC: 5.1 MMOL/L — SIGNIFICANT CHANGE UP (ref 3.5–5.3)
POTASSIUM SERPL-MCNC: 5.6 MMOL/L — HIGH (ref 3.5–5.3)
POTASSIUM SERPL-SCNC: 5.1 MMOL/L — SIGNIFICANT CHANGE UP (ref 3.5–5.3)
POTASSIUM SERPL-SCNC: 5.6 MMOL/L — HIGH (ref 3.5–5.3)
PROT SERPL-MCNC: 6.1 G/DL — SIGNIFICANT CHANGE UP (ref 6–8.3)
RBC # BLD: 2.16 M/UL — LOW (ref 3.8–5.2)
RBC # BLD: 2.68 M/UL — LOW (ref 3.8–5.2)
RBC # FLD: 24.7 % — HIGH (ref 10.3–14.5)
RBC # FLD: 25.9 % — HIGH (ref 10.3–14.5)
SODIUM SERPL-SCNC: 143 MMOL/L — SIGNIFICANT CHANGE UP (ref 135–145)
SODIUM SERPL-SCNC: 145 MMOL/L — SIGNIFICANT CHANGE UP (ref 135–145)
SPECIMEN SOURCE: SIGNIFICANT CHANGE UP
TIBC SERPL-MCNC: 172 UG/DL — LOW (ref 220–430)
UIBC SERPL-MCNC: 61 UG/DL — LOW (ref 110–370)
WBC # BLD: 6.22 K/UL — SIGNIFICANT CHANGE UP (ref 3.8–10.5)
WBC # BLD: 6.26 K/UL — SIGNIFICANT CHANGE UP (ref 3.8–10.5)
WBC # FLD AUTO: 6.22 K/UL — SIGNIFICANT CHANGE UP (ref 3.8–10.5)
WBC # FLD AUTO: 6.26 K/UL — SIGNIFICANT CHANGE UP (ref 3.8–10.5)

## 2024-02-28 PROCEDURE — 99233 SBSQ HOSP IP/OBS HIGH 50: CPT

## 2024-02-28 PROCEDURE — 93010 ELECTROCARDIOGRAM REPORT: CPT

## 2024-02-28 PROCEDURE — 76770 US EXAM ABDO BACK WALL COMP: CPT | Mod: 26

## 2024-02-28 RX ORDER — SODIUM ZIRCONIUM CYCLOSILICATE 10 G/10G
10 POWDER, FOR SUSPENSION ORAL
Refills: 0 | Status: COMPLETED | OUTPATIENT
Start: 2024-02-28 | End: 2024-02-29

## 2024-02-28 RX ORDER — SODIUM BICARBONATE 1 MEQ/ML
650 SYRINGE (ML) INTRAVENOUS THREE TIMES A DAY
Refills: 0 | Status: DISCONTINUED | OUTPATIENT
Start: 2024-02-28 | End: 2024-02-29

## 2024-02-28 RX ORDER — LANOLIN ALCOHOL/MO/W.PET/CERES
3 CREAM (GRAM) TOPICAL ONCE
Refills: 0 | Status: DISCONTINUED | OUTPATIENT
Start: 2024-02-28 | End: 2024-02-29

## 2024-02-28 RX ADMIN — Medication 75 MICROGRAM(S): at 05:29

## 2024-02-28 RX ADMIN — CEFTRIAXONE 100 MILLIGRAM(S): 500 INJECTION, POWDER, FOR SOLUTION INTRAMUSCULAR; INTRAVENOUS at 10:41

## 2024-02-28 RX ADMIN — SIMVASTATIN 20 MILLIGRAM(S): 20 TABLET, FILM COATED ORAL at 21:46

## 2024-02-28 RX ADMIN — Medication 2000 UNIT(S): at 13:11

## 2024-02-28 RX ADMIN — Medication 650 MILLIGRAM(S): at 21:46

## 2024-02-28 RX ADMIN — HEPARIN SODIUM 5000 UNIT(S): 5000 INJECTION INTRAVENOUS; SUBCUTANEOUS at 05:29

## 2024-02-28 RX ADMIN — HEPARIN SODIUM 5000 UNIT(S): 5000 INJECTION INTRAVENOUS; SUBCUTANEOUS at 18:24

## 2024-02-28 RX ADMIN — Medication 1 APPLICATORFUL: at 19:14

## 2024-02-28 RX ADMIN — Medication 3 MILLIGRAM(S): at 21:46

## 2024-02-28 RX ADMIN — SODIUM ZIRCONIUM CYCLOSILICATE 10 GRAM(S): 10 POWDER, FOR SUSPENSION ORAL at 18:24

## 2024-02-28 RX ADMIN — Medication 650 MILLIGRAM(S): at 18:24

## 2024-02-28 RX ADMIN — SODIUM CHLORIDE 65 MILLILITER(S): 9 INJECTION, SOLUTION INTRAVENOUS at 03:27

## 2024-02-28 NOTE — CONSULT NOTE ADULT - ASSESSMENT
Acute on CKD  Hyperkalemia  Metabolic Acidosis  Anemia      -Unknown baseline cr  -JODI likely prerenal  -Urine indices pending  -Renal sono unclear of hydro, but renal indices are improving with correction of anemia and IVF  -Continue IVF  -PRBC per primary team  -Lokelma 10gm x 2  -PO bicarb TID x 3 days  -Daily chem  -No acute indication for dialysis    D/W Primary
Pt is an 87 yo F with PMHx  sideroblastic anemia breast cancer (s/p L lumpectomy, radiation and tamoxifen, thyrotoxicosis s/p thyroidectomy, HTN, HLD, MDS presents to the ED with weakness and fatigue and sent to ED by primary for abnormal labs, admitted for JODI.     Acute Cystitis  JODI  Acute on chronic anemia  - JODI likely in the setting of infection vs. medication induced vs. prerenal 2/2 dehydration/anemia  - UA noted  Recommendations:   C/w ceftriaxone 1gm q24h x3 day course until 2/29  --can switch to PO cefpodoxime 200mg daily to complete course if pending d/c  F/u pending cx  Trend temps/WBC  Trend renal fxn/renally dose medications/avoid nephrotoxins  Trend Hgb; transfusion prn protocol  Supportive care and additional management per primary team    D/w Dr. Long  Infectious Diseases will continue to follow. Please call with any questions.   Elaine White M.D.  [facility] Division of Infectious Diseases [phone]  For after 5 P.M. and weekends, please call [phone]    
[ASSESSMENT and  PLAN]  D63.1 Anemia CKD  N18.4 CKD stage IV  D46.Z Other MDS  N17.8 JODI      88-year-old female well-known to practice, followed by Dr. Villa [hematology-oncology].  Patient admitted for JODI likely secondary to recent UTI and concurrent dehydration/volume depletion, possible AE from antibiotics.    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  Treated with adjuvant RT postsurgery, by Dr. Kelsie Weinberg..  Followed by 5 years of tamoxifen.    Subsequently diagnosed with sideroblastic anemia March 2012 treated with Procrit initially.  Subsequently changed to Aranesp December 2018.  Then changed to Retacrit August 2019.  History of elevated ferritin started treatment with Exjade March 2016.  Changed to Jadenu January 2017.    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 Gonzales May 2019.      Patient followed in office weekly for Procrit.  Last seen 02/23/2024.  CBC as follows:  02/23/24 WBC 6.32, Hgb 8.5,       RECOMMENDATIONS    JODI  IV fluids  Encourage p.o. hydration    UTI  Management per medicine  Antibiotics  Follow-up cultures.    Anemia status posttransfusion 1 unit PRBC.  Feeling better.  Transfuse PRBC as clinically indicated.   Transfuse PRBC if Hgb <7.0 or if symptomatic.   Follow CBC  Has follow-up appointment Friday, 03/01/2024 with Dr. Villa.  If stable from other parameters, no objection from hematology for discharge and follow-up in office.    Patient generally reliable.  Daughter involved with medical care.    DVT Prophylaxis  SQ Lovenox or SQ heparin  Out of bed as tolerated    Discussed plan of care with patient and or family in detail.   Pt/Family expressed understanding of the treatment plan.   Risks, benefits and alternatives discussed in detail.   Opportunity given for questions and discussion.   Questions or concerns all addressed and answered to their satisfaction, and in lay terms.     Discussed with Dr Long.    Thank you for consulting us.   No additional recommendations at current time.   Will sign off on case for now.   Please call, or re-consult if needed.     Thank you for consulting us.     > xxxxxx minutes spent in direct patient care, examining and counseling patient,  reviewing  the notes, lab data/ imaging , discussion with multidisciplinary team.

## 2024-02-28 NOTE — CONSULT NOTE ADULT - SUBJECTIVE AND OBJECTIVE BOX
INCOMPLETE NOTE.  Documentation in Progress  PT SEEN AND EVALUATED.   FULL/ADDITIONAL RECOMMENDATIONS TO FOLLOW   ***************************************************************    Patient is a 88y old  Female who presents with a chief complaint of JODI (28 Feb 2024 12:46)      HPI:  Pt is an 89 yo F with PMHx  sideroblastic anemia, breast cancer (s/p left lumpectomy, radiation and tamoxifen, thyrotoxicosis s/p thyroidectomy, HTN, HLD, MDS presents to the ED with weakness and fatigue. Pt states on Feb 12th had increased urinary frequency, went to UC and was prescribed 7 day course of Bactrim for UTI. Initially pt had relief for 1 week post abx course, however began feeling fatigued and returned to the UC who started her on Macrobid. She felt extremely nauseous with the medication, so stopped after a few days. Pt felt progressively weak so she went to her primary (NP) who ordered labs, which were significant for leukocytosis, elevated Cr Pts primary sent pt in for further evaluation and r/o sepsis.    Denies fever, chills, cough, abdominal pain, vomiting, diarrhea, constipation, dysuria      ED Course:   Vitals: BP: 135/65, HR: 86, Temp: 97.8, RR: 16 , SpO2: 98% on RA   Labs: WBC 8.16, Hg 7.6, K 5.7, BUN/Cr 65/4.1, lactate .6, procal 2.71  UA: Moderate LEC, 20 WBC, few bacteria, + squamous epi  Bladder scan: 50cc residual  CXR: < from: Xray Chest 1 View-PORTABLE IMMEDIATE (02.27.24 @ 16:28) >  1. Linear scarring in the left upper lobe is unchanged from the prior   exam.  2. No evidence of pneumonia, pleural effusion or CHF.  3. Surgical clips medial to the left apex, unchanged.     Received in the ED: LR bolus 1000cc x1   (27 Feb 2024 18:07)      PAST MEDICAL & SURGICAL HISTORY:  Breast Cancer  Left Breast  tx surgery , rt and tamoxifen      Hyperlipidemia      Anemia      MDS (myelodysplastic syndrome)      H/O hyperthyroidism      Vertigo  last summer 2018      Gout  2018      HTN (hypertension)      History of blood transfusion  6/2019, "i may have hemachromatosis"      Iron excess      H/O thyrotoxicosis      S/P Breast Lumpectomy  left 2009      H/O thyroidectomy  left May 2019         HEALTH ISSUES - PROBLEM Dx:  JODI (acute kidney injury)    Acute UTI    Need for prophylactic measure    Hyperkalemia    HTN (hypertension)    HLD (hyperlipidemia)    Sideroblastic anemia          Acute renal failure [N17.9]    Breast Cancer [174.9]    Hyperlipidemia [272.4]    Anemia [D64.9]    MDS (myelodysplastic syndrome) [D46.9]    H/O hyperthyroidism [Z86.39]    Vertigo [R42]    Hypercholesterolemia [E78.00]    Gout [M10.9]    HTN (hypertension) [I10]    History of blood transfusion reaction [Z87.898]    History of blood transfusion [Z92.89]    Iron excess [E83.19]    H/O thyrotoxicosis [Z86.39]    S/P Breast Lumpectomy [V45.89]    S/P Breast Lumpectomy [V45.89]    H/O thyroidectomy [Z98.890]      FAMILY HISTORY:  FH: breast cancer (Mother)  mother    FH: colon cancer (Sibling)  brother    FH: coronary artery disease  father, brother        [SOCIAL HISTORY: ]     smoking:  none currently     EtOH:  none currently     illicit drugs:  none currently     occupation:  Retired     marital status:       Other:       [ALLERGIES/INTOLERANCES:]  Allergies    penicillin (Unknown)  codeine (Other)    Intolerances        [MEDICATIONS]  MEDICATIONS  (STANDING):  cefTRIAXone   IVPB 1000 milliGRAM(s) IV Intermittent every 24 hours  cholecalciferol 2000 Unit(s) Oral daily  clotrimazole 2% Vaginal Cream 1 Applicatorful Vaginal daily  heparin   Injectable 5000 Unit(s) SubCutaneous every 12 hours  lactated ringers. 1000 milliLiter(s) (65 mL/Hr) IV Continuous <Continuous>  levothyroxine 75 MICROGram(s) Oral daily  melatonin 3 milliGRAM(s) Oral once  senna 2 Tablet(s) Oral at bedtime  simvastatin 20 milliGRAM(s) Oral at bedtime  sodium bicarbonate 650 milliGRAM(s) Oral three times a day  sodium zirconium cyclosilicate 10 Gram(s) Oral two times a day    MEDICATIONS  (PRN):  acetaminophen     Tablet .. 650 milliGRAM(s) Oral every 6 hours PRN Temp greater or equal to 38C (100.4F), Mild Pain (1 - 3)  aluminum hydroxide/magnesium hydroxide/simethicone Suspension 30 milliLiter(s) Oral every 4 hours PRN Dyspepsia  melatonin 3 milliGRAM(s) Oral at bedtime PRN Insomnia  ondansetron Injectable 4 milliGRAM(s) IV Push every 8 hours PRN Nausea and/or Vomiting      [REVIEW OF SYSTEMS: ]  CONSTITUTIONAL: normal, no fever, no shakes, no chills   EYES: No eye pain, no visual disturbances, no discharge  ENMT:  no discharge  NECK: No pain, no stiffness  BREASTS: No pain, no masses, no nipple discharge  RESPIRATORY: No cough, no wheezing, no chills, no hemoptysis; No shortness of breath  CARDIOVASCULAR: No chest pain, no palpitations, no dizziness, no leg swelling  GASTROINTESTINAL: No abdominal, no epigastric pain. No nausea, no vomiting, no hematemesis; No diarrhea , no constipation. No melena, no hematochezia.  GENITOURINARY: No dysuria, no frequency, no hematuria, no incontinence  NEUROLOGICAL: No headaches, no memory loss, no loss of strength, no numbness, no tremors  SKIN: No itching, no burning, no rashes, no lesions   LYMPH NODES: No enlarged glands  ENDOCRINE: No heat or cold intolerance; No hair loss  MUSCULOSKELETAL: No joint pain or swelling; No muscle, no back, no extremity pain  PSYCHIATRIC: No depression, no anxiety, no mood swings, no difficulty sleeping  HEME/LYMPH: No easy bruising, no bleeding gums    [VITALS SIGNS 24hrs]  Vital Signs Last 24 Hrs  T(C): 36.5 (28 Feb 2024 21:14), Max: 36.5 (28 Feb 2024 04:45)  T(F): 97.7 (28 Feb 2024 21:14), Max: 97.7 (28 Feb 2024 04:45)  HR: 85 (28 Feb 2024 21:14) (70 - 85)  BP: 155/70 (28 Feb 2024 21:14) (135/72 - 155/70)  BP(mean): --  RR: 18 (28 Feb 2024 21:14) (18 - 18)  SpO2: 92% (28 Feb 2024 21:14) (92% - 93%)    Parameters below as of 28 Feb 2024 21:14  Patient On (Oxygen Delivery Method): room air      Daily     Daily     I&O's Summary      [PHYSICAL EXAM]  GEN:   HEENT: normocephalic and atraumatic. EOMI. PERRL.    NECK: Supple.  No lymphadenopathy   LUNGS: Clear to auscultation.  HEART: S1S2 Regular rate and rhythm, no MRG  ABDOMEN: Soft, nontender, and nondistended.  Positive bowel sounds.    : No CVA tenderness  EXTREMITIES: Without edema.  NEUROLOGIC: grossly intact.  PSYCHIATRIC: Appropriate affect .  SKIN: No rash     [LABS: ]                        8.1    6.26  )-----------( 245      ( 28 Feb 2024 07:47 )             25.5     CBC Full  -  ( 28 Feb 2024 07:47 )  WBC Count : 6.26 K/uL  RBC Count : 2.68 M/uL  Hemoglobin : 8.1 g/dL  Hematocrit : 25.5 %  Platelet Count - Automated : 245 K/uL  Mean Cell Volume : 95.1 fl  Mean Cell Hemoglobin : 30.2 pg  Mean Cell Hemoglobin Concentration : 31.8 gm/dL  Auto Neutrophil # : 3.57 K/uL  Auto Lymphocyte # : 1.07 K/uL  Auto Monocyte # : 0.88 K/uL  Auto Eosinophil # : 0.62 K/uL  Auto Basophil # : 0.06 K/uL  Auto Neutrophil % : 56.9 %  Auto Lymphocyte % : 17.1 %  Auto Monocyte % : 14.1 %  Auto Eosinophil % : 9.9 %  Auto Basophil % : 1.0 %    02-28    145  |  120<H>  |  60<H>  ----------------------------<  91  5.6<H>   |  18<L>  |  3.30<H>    Ca    8.9      28 Feb 2024 07:47  Phos  3.7     02-28  Mg     2.2     02-28    TPro  6.1  /  Alb  3.0<L>  /  TBili  1.1  /  DBili  x   /  AST  10<L>  /  ALT  14  /  AlkPhos  47  02-28    PT/INR - ( 27 Feb 2024 15:30 )   PT: 11.0 sec;   INR: 0.94 ratio         PTT - ( 27 Feb 2024 15:30 )  PTT:28.6 sec  LIVER FUNCTIONS - ( 28 Feb 2024 07:47 )  Alb: 3.0 g/dL / Pro: 6.1 g/dL / ALK PHOS: 47 U/L / ALT: 14 U/L / AST: 10 U/L / GGT: x               Urinalysis Basic - ( 28 Feb 2024 07:47 )    Color: x / Appearance: x / SG: x / pH: x  Gluc: 91 mg/dL / Ketone: x  / Bili: x / Urobili: x   Blood: x / Protein: x / Nitrite: x   Leuk Esterase: x / RBC: x / WBC x   Sq Epi: x / Non Sq Epi: x / Bacteria: x          CBC TREND (5 Days)  WBC Count: 6.26 K/uL (02-28 @ 07:47)  WBC Count: 6.22 K/uL (02-27 @ 23:52)  WBC Count: 8.16 K/uL (02-27 @ 15:30)    Hemoglobin: 8.1 g/dL (02-28 @ 07:47)  Hemoglobin: 6.6 g/dL (02-27 @ 23:52)  Hemoglobin: 7.6 g/dL (02-27 @ 15:30)    Hematocrit: 25.5 % (02-28 @ 07:47)  Hematocrit: 21.0 % (02-27 @ 23:52)  Hematocrit: 24.7 % (02-27 @ 15:30)    Platelet Count - Automated: 245 K/uL (02-28 @ 07:47)  Platelet Count - Automated: 217 K/uL (02-27 @ 23:52)  Platelet Count - Automated: 203 K/uL (02-27 @ 15:30)        Ferritin: 974 ng/mL (02-28 @ 07:47)    Iron Total: 110 ug/dL (02-28 @ 07:47)                                    [MICROBIOLOGY /  VIROLOGY:]        Culture - Urine (collected 27 Feb 2024 16:22)  Source: Clean Catch Clean Catch (Midstream)  Final Report (28 Feb 2024 22:14):    <10,000 CFU/mL Normal Urogenital Daisy    Culture - Blood (collected 27 Feb 2024 15:35)  Source: .Blood Blood-Peripheral  Preliminary Report (28 Feb 2024 22:01):    No growth at 24 hours    Culture - Blood (collected 27 Feb 2024 15:30)  Source: .Blood Blood-Peripheral  Preliminary Report (28 Feb 2024 22:01):    No growth at 24 hours        [PATHOLOGY]       [RADIOLOGY & ADDITIONAL STUDIES:]        Patient is a 88y old  Female who presents with a chief complaint of JODI (28 Feb 2024 12:46)      HPI:  Pt is an 87 yo F with PMHx  sideroblastic anemia, breast cancer (s/p left lumpectomy, radiation and tamoxifen, thyrotoxicosis s/p thyroidectomy, HTN, HLD, MDS presents to the ED with weakness and fatigue. Pt states on Feb 12th had increased urinary frequency, went to UC and was prescribed 7 day course of Bactrim for UTI. Initially pt had relief for 1 week post abx course, however began feeling fatigued and returned to the UC who started her on Macrobid. She felt extremely nauseous with the medication, so stopped after a few days. Pt felt progressively weak so she went to her primary (NP) who ordered labs, which were significant for leukocytosis, elevated Cr Pts primary sent pt in for further evaluation and r/o sepsis.    Denies fever, chills, cough, abdominal pain, vomiting, diarrhea, constipation, dysuria      ED Course:   Vitals: BP: 135/65, HR: 86, Temp: 97.8, RR: 16 , SpO2: 98% on RA   Labs: WBC 8.16, Hg 7.6, K 5.7, BUN/Cr 65/4.1, lactate .6, procal 2.71  UA: Moderate LEC, 20 WBC, few bacteria, + squamous epi  Bladder scan: 50cc residual  CXR: < from: Xray Chest 1 View-PORTABLE IMMEDIATE (02.27.24 @ 16:28) >  1. Linear scarring in the left upper lobe is unchanged from the prior   exam.  2. No evidence of pneumonia, pleural effusion or CHF.  3. Surgical clips medial to the left apex, unchanged.     Received in the ED: LR bolus 1000cc x1   (27 Feb 2024 18:07)      PAST MEDICAL & SURGICAL HISTORY:  Breast Cancer  Left Breast  tx surgery , rt and tamoxifen      Hyperlipidemia      Anemia      MDS (myelodysplastic syndrome)      H/O hyperthyroidism      Vertigo  last summer 2018      Gout  2018      HTN (hypertension)      History of blood transfusion  6/2019, "i may have hemachromatosis"      Iron excess      H/O thyrotoxicosis      S/P Breast Lumpectomy  left 2009      H/O thyroidectomy  left May 2019         HEALTH ISSUES - PROBLEM Dx:  JODI (acute kidney injury)    Acute UTI    Need for prophylactic measure    Hyperkalemia    HTN (hypertension)    HLD (hyperlipidemia)    Sideroblastic anemia      Acute renal failure [N17.9]  Breast Cancer [174.9]  Hyperlipidemia [272.4]  Anemia [D64.9]  MDS (myelodysplastic syndrome) [D46.9]  H/O hyperthyroidism [Z86.39]  Vertigo [R42]  Hypercholesterolemia [E78.00]  Gout [M10.9]  HTN (hypertension) [I10]  History of blood transfusion reaction [Z87.898]  History of blood transfusion [Z92.89]  Iron excess [E83.19]  H/O thyrotoxicosis [Z86.39]  S/P Breast Lumpectomy [V45.89]  S/P Breast Lumpectomy [V45.89]  H/O thyroidectomy [Z98.890]      FAMILY HISTORY:  FH: breast cancer (Mother)  mother    FH: colon cancer (Sibling)  brother    FH: coronary artery disease  father, brother      [SOCIAL HISTORY: ]     smoking:  none currently, Ex-smoker 2 pack/day for 30 years.  Quit 1986.     EtOH:  none currently.  Rare alcohol.     illicit drugs:  none currently     occupation:  Retired     marital status:       Other:       [ALLERGIES/INTOLERANCES:]  Allergies     penicillin (Unknown)     codeine (Other)  Intolerances      [MEDICATIONS]  MEDICATIONS  (STANDING):  cefTRIAXone   IVPB 1000 milliGRAM(s) IV Intermittent every 24 hours  cholecalciferol 2000 Unit(s) Oral daily  clotrimazole 2% Vaginal Cream 1 Applicatorful Vaginal daily  heparin   Injectable 5000 Unit(s) SubCutaneous every 12 hours  lactated ringers. 1000 milliLiter(s) (65 mL/Hr) IV Continuous <Continuous>  levothyroxine 75 MICROGram(s) Oral daily  melatonin 3 milliGRAM(s) Oral once  senna 2 Tablet(s) Oral at bedtime  simvastatin 20 milliGRAM(s) Oral at bedtime  sodium bicarbonate 650 milliGRAM(s) Oral three times a day  sodium zirconium cyclosilicate 10 Gram(s) Oral two times a day      MEDICATIONS  (PRN):  acetaminophen     Tablet .. 650 milliGRAM(s) Oral every 6 hours PRN Temp greater or equal to 38C (100.4F), Mild Pain (1 - 3)  aluminum hydroxide/magnesium hydroxide/simethicone Suspension 30 milliLiter(s) Oral every 4 hours PRN Dyspepsia  melatonin 3 milliGRAM(s) Oral at bedtime PRN Insomnia  ondansetron Injectable 4 milliGRAM(s) IV Push every 8 hours PRN Nausea and/or Vomiting      [REVIEW OF SYSTEMS: ]  CONSTITUTIONAL: normal, no fever, no shakes, no chills   EYES: No eye pain, no visual disturbances, no discharge  ENMT:  no discharge  NECK: No pain, no stiffness  BREASTS: No pain, no masses, no nipple discharge  RESPIRATORY: No cough, no wheezing, no chills, no hemoptysis; No shortness of breath  CARDIOVASCULAR: No chest pain, no palpitations, no dizziness, no leg swelling  GASTROINTESTINAL: No abdominal, no epigastric pain. +nausea, no vomiting, no hematemesis; No diarrhea , no constipation. No melena, no hematochezia.  GENITOURINARY: No dysuria, no frequency, no hematuria, no incontinence  NEUROLOGICAL: No headaches, no memory loss, no loss of strength, no numbness, no tremors  SKIN: No itching, no burning, no rashes, no lesions   LYMPH NODES: No enlarged glands  ENDOCRINE: No heat or cold intolerance; No hair loss  MUSCULOSKELETAL: No joint pain or swelling; No muscle, no back, no extremity pain  PSYCHIATRIC: No depression, no anxiety, no mood swings, no difficulty sleeping  HEME/LYMPH: No easy bruising, no bleeding gums    [VITALS SIGNS 24hrs]  Vital Signs Last 24 Hrs  T(C): 36.5 (28 Feb 2024 21:14), Max: 36.5 (28 Feb 2024 04:45)  T(F): 97.7 (28 Feb 2024 21:14), Max: 97.7 (28 Feb 2024 04:45)  HR: 85 (28 Feb 2024 21:14) (70 - 85)  BP: 155/70 (28 Feb 2024 21:14) (135/72 - 155/70)  BP(mean): --  RR: 18 (28 Feb 2024 21:14) (18 - 18)  SpO2: 92% (28 Feb 2024 21:14) (92% - 93%)    Parameters below as of 28 Feb 2024 21:14  Patient On (Oxygen Delivery Method): room air      Daily     Daily     I&O's Summary      [PHYSICAL EXAM]  GEN: Well-nourished well-developed no acute distress.  HEENT: normocephalic and atraumatic. EOMI. PERRL.    NECK: Supple.  No lymphadenopathy   LUNGS: Clear to auscultation.  HEART: S1S2 Regular rate and rhythm, no MRG  ABDOMEN: Soft, nontender, and nondistended.  Positive bowel sounds.    : No CVA tenderness  EXTREMITIES: Without edema.  NEUROLOGIC: grossly intact.  PSYCHIATRIC: Appropriate affect .  SKIN: No rash     [LABS: ]                        8.1    6.26  )-----------( 245      ( 28 Feb 2024 07:47 )             25.5     CBC Full  -  ( 28 Feb 2024 07:47 )  WBC Count : 6.26 K/uL  RBC Count : 2.68 M/uL  Hemoglobin : 8.1 g/dL  Hematocrit : 25.5 %  Platelet Count - Automated : 245 K/uL  Mean Cell Volume : 95.1 fl  Mean Cell Hemoglobin : 30.2 pg  Mean Cell Hemoglobin Concentration : 31.8 gm/dL  Auto Neutrophil # : 3.57 K/uL  Auto Lymphocyte # : 1.07 K/uL  Auto Monocyte # : 0.88 K/uL  Auto Eosinophil # : 0.62 K/uL  Auto Basophil # : 0.06 K/uL  Auto Neutrophil % : 56.9 %  Auto Lymphocyte % : 17.1 %  Auto Monocyte % : 14.1 %  Auto Eosinophil % : 9.9 %  Auto Basophil % : 1.0 %    02-28    145  |  120<H>  |  60<H>  ----------------------------<  91  5.6<H>   |  18<L>  |  3.30<H>    Ca    8.9      28 Feb 2024 07:47  Phos  3.7     02-28  Mg     2.2     02-28    TPro  6.1  /  Alb  3.0<L>  /  TBili  1.1  /  DBili  x   /  AST  10<L>  /  ALT  14  /  AlkPhos  47  02-28    PT/INR - ( 27 Feb 2024 15:30 )   PT: 11.0 sec;   INR: 0.94 ratio         PTT - ( 27 Feb 2024 15:30 )  PTT:28.6 sec  LIVER FUNCTIONS - ( 28 Feb 2024 07:47 )  Alb: 3.0 g/dL / Pro: 6.1 g/dL / ALK PHOS: 47 U/L / ALT: 14 U/L / AST: 10 U/L / GGT: x             Urinalysis Basic - ( 28 Feb 2024 07:47 )  Color: x / Appearance: x / SG: x / pH: x  Gluc: 91 mg/dL / Ketone: x  / Bili: x / Urobili: x   Blood: x / Protein: x / Nitrite: x   Leuk Esterase: x / RBC: x / WBC x   Sq Epi: x / Non Sq Epi: x / Bacteria: x      CBC TREND (5 Days)  WBC Count: 6.26 K/uL (02-28 @ 07:47)  WBC Count: 6.22 K/uL (02-27 @ 23:52)  WBC Count: 8.16 K/uL (02-27 @ 15:30)  Hemoglobin: 8.1 g/dL (02-28 @ 07:47)  Hemoglobin: 6.6 g/dL (02-27 @ 23:52)  Hemoglobin: 7.6 g/dL (02-27 @ 15:30)  Hematocrit: 25.5 % (02-28 @ 07:47)  Hematocrit: 21.0 % (02-27 @ 23:52)  Hematocrit: 24.7 % (02-27 @ 15:30)  Platelet Count - Automated: 245 K/uL (02-28 @ 07:47)  Platelet Count - Automated: 217 K/uL (02-27 @ 23:52)  Platelet Count - Automated: 203 K/uL (02-27 @ 15:30)    Ferritin: 974 ng/mL (02-28 @ 07:47)  Iron Total: 110 ug/dL (02-28 @ 07:47)      [MICROBIOLOGY /  VIROLOGY:]    Culture - Urine (collected 27 Feb 2024 16:22)  Source: Clean Catch Clean Catch (Midstream)  Final Report (28 Feb 2024 22:14):    <10,000 CFU/mL Normal Urogenital Daisy    Culture - Blood (collected 27 Feb 2024 15:35)  Source: .Blood Blood-Peripheral  Preliminary Report (28 Feb 2024 22:01):    No growth at 24 hours    Culture - Blood (collected 27 Feb 2024 15:30)  Source: .Blood Blood-Peripheral  Preliminary Report (28 Feb 2024 22:01):    No growth at 24 hours      [PATHOLOGY]       [RADIOLOGY & ADDITIONAL STUDIES:]       < from: US Kidney and Bladder (02.28.24 @ 09:36) >    ACC: [id number] EXAM:  US KIDNEYS AND BLADDER   ORDERED BY: MITCHELL PERAZA     PROCEDURE DATE:  02/28/2024          INTERPRETATION:  CLINICAL INFORMATION: Urinary tract infection. Elevated   creatinine. Acute kidney injury.    COMPARISON: Abdominal ultrasound 6/29/2019. CT chest/abdomen/pelvis   7/12/2019.    TECHNIQUE: Sonography of the kidneys and bladder.    FINDINGS:  Mildly limited evaluation secondary to overlying bowel gas.    Right kidney: 10.2 cm. No renal mass or calculi. Right renal cysts,   largest 4.6 cm in the lower pole. Mild fullness of the right renal   pelvis, decreased postvoid.    Left kidney: 9.8 cm. No renal mass or calculi. Left renal cysts. Left   parapelvic cyst, 2.5 cm, less likely hydronephrosis.    Urinary bladder:Within normal limits. Prevoid volume: 203.4 cc. Postvoid   bladder volume: 7.6 cc.    IMPRESSION:  Mild fullness of the right renal pelvis, decreased postvoid.    Left parapelvic cyst, 2.5 cm, less likely hydronephrosis.  --- End of Report ---  RUTH BASS MD; Attending Radiologist  This document has been electronically signed. Feb 28 2024 10:12AM

## 2024-02-28 NOTE — PROGRESS NOTE ADULT - SUBJECTIVE AND OBJECTIVE BOX
Name: ARIADNA TAYLOR  MRN: [id number]  LOCATION: [place] [address]    ----  Patient is a 88y old  Female who presents with a chief complaint of JODI (28 Feb 2024 12:30)      FROM ADMISSION H+P:   HPI:  Pt is an 87 yo F with PMHx  sideroblastic anemia, breast cancer (s/p left lumpectomy, radiation and tamoxifen, thyrotoxicosis s/p thyroidectomy, HTN, HLD, MDS presents to the ED with weakness and fatigue     ----  INTERVAL HPI/OVERNIGHT EVENTS: Pt seen and evaluated at the bedside. No acute overnight events occurred. The patient overall reports feeling significantly improved today compared to yesterday. She has less weakness. She has less fatigue. She states that her appetite is always a bit down however appears to be essentially normal for her. She tolerated breakfast well. She has had no clinical evidence of bleeding. Her urine has been pale yellow. No sediment noted. She was given an IV fluid infusion and a unit of packed red blood cells overnight since that time she reports urinary urgency and frequency. She has voided approximately every hour. She has no muscle aches or cramps. Denies fever or chills    The patient’s family members are currently at the bedside. They asked multiple appropriate questions regarding the patient's blood work results. We reviewed imaging results. We discussed consultant recommendations.          ----  PAST MEDICAL & SURGICAL HISTORY:  Breast Cancer  Left Breast  tx surgery , rt and tamoxifen      Hyperlipidemia      Anemia      MDS (myelodysplastic syndrome)      H/O hyperthyroidism      Vertigo  last summer 2018      Gout  2018      HTN (hypertension)      History of blood transfusion  6/2019, "i may have hemachromatosis"      Iron excess      H/O thyrotoxicosis      S/P Breast Lumpectomy  left 2009      H/O thyroidectomy  left May 2019          FAMILY HISTORY:  FH: breast cancer (Mother)  mother    FH: colon cancer (Sibling)  brother    FH: coronary artery disease  father, brother        Allergies    penicillin (Unknown)  codeine (Other)    Intolerances        ----  REVIEW OF SYSTEMS:  CONSTITUTIONAL: denies fever, chills   HEENT: denies blurred vision, sore throat  CARDIOVASCULAR: denies chest pain, chest pressure, palpitations  RESPIRATORY: denies shortness of breath, sputum production  GASTROINTESTINAL: denies nausea, vomiting   NEUROLOGICAL: denies numbness, headache, focal weakness  MUSCULOSKELETAL: denies new joint pain, muscle aches    ----  PHYSICAL EXAM:  GENERAL: patient appears well, no acute distress  ENMT: oropharynx clear without erythema, moist mucous membranes  LUNGS: good air entry bilaterally, clear to auscultation, symmetric breath sounds, no wheezing or rhonchi appreciated  HEART: S1/S2, regular rate and rhythm, no murmurs noted, no noted edema to b/l LE  GASTROINTESTINAL: abdomen is soft, nontender, nondistended, normoactive bowel sounds, no palpable masses  : no suprapubic fullness or tenderness to palpation  MUSCULOSKELETAL: no clubbing or cyanosis, no obvious deformity  NEUROLOGIC: awake, alert, readily interactive, good muscle tone in 4 extremities    T(C): 36.5 (02-28-24 @ 04:45), Max: 36.6 (02-27-24 @ 14:51)  HR: 83 (02-28-24 @ 04:45) (82 - 86)  BP: 145/72 (02-28-24 @ 04:45) (128/72 - 145/72)  RR: 18 (02-28-24 @ 04:45) (16 - 18)  SpO2: 92% (02-28-24 @ 04:45) (92% - 96%)  Wt(kg): --    ----  INTAKE & OUTPUT:  I&O's Summary      LABS:                        8.1    6.26  )-----------( 245      ( 28 Feb 2024 07:47 )             25.5     02-28    145  |  120<H>  |  60<H>  ----------------------------<  91  5.6<H>   |  18<L>  |  3.30<H>    Ca    8.9      28 Feb 2024 07:47  Phos  3.7     02-28  Mg     2.2     02-28    TPro  6.1  /  Alb  3.0<L>  /  TBili  1.1  /  DBili  x   /  AST  10<L>  /  ALT  14  /  AlkPhos  47  02-28    PT/INR - ( 27 Feb 2024 15:30 )   PT: 11.0 sec;   INR: 0.94 ratio         PTT - ( 27 Feb 2024 15:30 )  PTT:28.6 sec  Urinalysis Basic - ( 28 Feb 2024 07:47 )    Color: x / Appearance: x / SG: x / pH: x  Gluc: 91 mg/dL / Ketone: x  / Bili: x / Urobili: x   Blood: x / Protein: x / Nitrite: x   Leuk Esterase: x / RBC: x / WBC x   Sq Epi: x / Non Sq Epi: x / Bacteria: x      CAPILLARY BLOOD GLUCOSE              ----  DATA REVIEW:  Personally reviewed:  -Vital sign trends:  afebrile, hr trends are stable, bp stable, on room air  -Laboratory results:  K is up again today, renal indices are better  -Radiology results:    -Microbio results:    -Consultant recommendations:              ----  ACTIVE MEDICATIONS (by system):  acetaminophen     Tablet .. 650 milliGRAM(s) Oral every 6 hours PRN  aluminum hydroxide/magnesium hydroxide/simethicone Suspension 30 milliLiter(s) Oral every 4 hours PRN  cefTRIAXone   IVPB 1000 milliGRAM(s) IV Intermittent every 24 hours  cholecalciferol 2000 Unit(s) Oral daily  clotrimazole 2% Vaginal Cream 1 Applicatorful Vaginal daily  heparin   Injectable 5000 Unit(s) SubCutaneous every 12 hours  lactated ringers. 1000 milliLiter(s) IV Continuous <Continuous>  levothyroxine 75 MICROGram(s) Oral daily  melatonin 3 milliGRAM(s) Oral at bedtime PRN  melatonin 3 milliGRAM(s) Oral once  ondansetron Injectable 4 milliGRAM(s) IV Push every 8 hours PRN  senna 2 Tablet(s) Oral at bedtime  simvastatin 20 milliGRAM(s) Oral at bedtime  sodium bicarbonate 650 milliGRAM(s) Oral three times a day  sodium zirconium cyclosilicate 10 Gram(s) Oral two times a day       Name: ARIADNA TAYLOR  MRN: [id number]  LOCATION: [place] [address]    ----  Patient is a 88y old  Female who presents with a chief complaint of JODI (28 Feb 2024 12:30)      FROM ADMISSION H+P:   HPI:  Pt is an 87 yo F with PMHx  sideroblastic anemia, breast cancer (s/p left lumpectomy, radiation and tamoxifen, thyrotoxicosis s/p thyroidectomy, HTN, HLD, MDS presents to the ED with weakness and fatigue     ----  INTERVAL HPI/OVERNIGHT EVENTS: Pt seen and evaluated at the bedside. No acute overnight events occurred. The patient overall reports feeling significantly improved today compared to yesterday. She has less weakness. She has less fatigue. She states that her appetite is always a bit down however appears to be essentially normal for her. She tolerated breakfast well. She has had no clinical evidence of bleeding. Her urine has been pale yellow. No sediment noted. She was given an IV fluid infusion and a unit of packed red blood cells overnight since that time she reports urinary urgency and frequency. She has voided approximately every hour. She has no muscle aches or cramps. Denies fever or chills    The patient’s family members are currently at the bedside. They asked multiple appropriate questions regarding the patient's blood work results. We reviewed imaging results. We discussed consultant recommendations.          ----  PAST MEDICAL & SURGICAL HISTORY:  Breast Cancer  Left Breast  tx surgery , rt and tamoxifen      Hyperlipidemia      Anemia      MDS (myelodysplastic syndrome)      H/O hyperthyroidism      Vertigo  last summer 2018      Gout  2018      HTN (hypertension)      History of blood transfusion  6/2019, "i may have hemachromatosis"      Iron excess      H/O thyrotoxicosis      S/P Breast Lumpectomy  left 2009      H/O thyroidectomy  left May 2019          FAMILY HISTORY:  FH: breast cancer (Mother)  mother    FH: colon cancer (Sibling)  brother    FH: coronary artery disease  father, brother        Allergies    penicillin (Unknown)  codeine (Other)    Intolerances        ----  REVIEW OF SYSTEMS:  CONSTITUTIONAL: denies fever, chills   HEENT: denies blurred vision, sore throat  CARDIOVASCULAR: denies chest pain, chest pressure, palpitations  RESPIRATORY: denies shortness of breath, sputum production  GASTROINTESTINAL: denies nausea, vomiting   NEUROLOGICAL: denies numbness, headache, focal weakness  MUSCULOSKELETAL: denies new joint pain, muscle aches    ----  PHYSICAL EXAM:  GENERAL: patient appears well, no acute distress  ENMT: oropharynx clear without erythema, moist mucous membranes  LUNGS: good air entry bilaterally, clear to auscultation, symmetric breath sounds, no wheezing or rhonchi appreciated  HEART: S1/S2, regular rate and rhythm, no murmurs noted, no noted edema to b/l LE  GASTROINTESTINAL: abdomen is soft, nontender, nondistended, normoactive bowel sounds, no palpable masses  : no suprapubic fullness or tenderness to palpation  MUSCULOSKELETAL: no clubbing or cyanosis, no obvious deformity  NEUROLOGIC: awake, alert, readily interactive, good muscle tone in 4 extremities    T(C): 36.5 (02-28-24 @ 04:45), Max: 36.6 (02-27-24 @ 14:51)  HR: 83 (02-28-24 @ 04:45) (82 - 86)  BP: 145/72 (02-28-24 @ 04:45) (128/72 - 145/72)  RR: 18 (02-28-24 @ 04:45) (16 - 18)  SpO2: 92% (02-28-24 @ 04:45) (92% - 96%)  Wt(kg): --    ----  INTAKE & OUTPUT:  I&O's Summary      LABS:                        8.1    6.26  )-----------( 245      ( 28 Feb 2024 07:47 )             25.5     02-28    145  |  120<H>  |  60<H>  ----------------------------<  91  5.6<H>   |  18<L>  |  3.30<H>    Ca    8.9      28 Feb 2024 07:47  Phos  3.7     02-28  Mg     2.2     02-28    TPro  6.1  /  Alb  3.0<L>  /  TBili  1.1  /  DBili  x   /  AST  10<L>  /  ALT  14  /  AlkPhos  47  02-28    PT/INR - ( 27 Feb 2024 15:30 )   PT: 11.0 sec;   INR: 0.94 ratio         PTT - ( 27 Feb 2024 15:30 )  PTT:28.6 sec  Urinalysis Basic - ( 28 Feb 2024 07:47 )    Color: x / Appearance: x / SG: x / pH: x  Gluc: 91 mg/dL / Ketone: x  / Bili: x / Urobili: x   Blood: x / Protein: x / Nitrite: x   Leuk Esterase: x / RBC: x / WBC x   Sq Epi: x / Non Sq Epi: x / Bacteria: x      CAPILLARY BLOOD GLUCOSE              ----  DATA REVIEW:  Personally reviewed:  -Vital sign trends:  afebrile, hr trends are stable, bp stable, on room air  -Laboratory results:  K is up again today, renal indices are better, no leukocytosis, persistent metabolic acidosis  -Radiology results:  possible mild hydro seen on renal sono  -Microbio results:  nothing avail yet  -Consultant recommendations:  spoke w/ nephro, id today. spoke w/ outpt heme and the group will assess while admitted            ----  ACTIVE MEDICATIONS (by system):  - CV - simvastatin 20 milliGRAM(s) Oral at bedtime  - ENDO - levothyroxine 75 MICROGram(s) Oral daily  - GI - aluminum hydroxide/magnesium hydroxide/simethicone Suspension 30 milliLiter(s) Oral every 4 hours PRN  - GI - ondansetron Injectable 4 milliGRAM(s) IV Push every 8 hours PRN  - GI - senna 2 Tablet(s) Oral at bedtime  - ID - cefTRIAXone   IVPB 1000 milliGRAM(s) IV Intermittent every 24 hours  - ID - clotrimazole 2% Vaginal Cream 1 Applicatorful Vaginal daily  - IVF - lactated ringers. 1000 milliLiter(s) IV Continuous <Continuous>  - MSK - acetaminophen     Tablet .. 650 milliGRAM(s) Oral every 6 hours PRN  - PSYCH - melatonin 3 milliGRAM(s) Oral at bedtime PRN  - RENAL - sodium bicarbonate 650 milliGRAM(s) Oral three times a day  - RENAL - sodium zirconium cyclosilicate 10 Gram(s) Oral two times a day  - SUPPLEMENT - cholecalciferol 2000 Unit(s) Oral daily  - VTE PPX - heparin   Injectable 5000 Unit(s) SubCutaneous every 12 hours

## 2024-02-28 NOTE — CONSULT NOTE ADULT - REASON FOR ADMISSION
[facility] Hospitalist Progress Note        Subjective   Pt seen and examined. Pt denies any abd pain, Nausea, vomiting. +diarrhea, improving, formed stool this am. No urinary complaints. No acute events overnight.  Objective     I/O's  No intake or output data in the 24 hours ending 01/14/21 0126    Last Recorded Vitals  Blood pressure (!) 165/81, pulse 76, temperature 100 °F (37.8 °C), temperature source Oral, resp. rate 16, height 6' 2\" (1.88 m), weight 121.5 kg (267 lb 13.7 oz), SpO2 100 %, currently breastfeeding.  Body mass index is 34.39 kg/m².    Physical Exam  Vitals signs and nursing note reviewed.   Constitutional:       Appearance: Normal appearance.   HENT:      Head: Normocephalic.   Eyes:      Conjunctiva/sclera: Conjunctivae normal.   Neck:      Musculoskeletal: Neck supple.   Cardiovascular:      Rate and Rhythm: Normal rate and regular rhythm.      Heart sounds: Normal heart sounds.   Pulmonary:      Effort: Pulmonary effort is normal. No respiratory distress.      Breath sounds: Normal breath sounds. No wheezing.   Abdominal:      General: Bowel sounds are normal. There is no distension.      Palpations: Abdomen is soft.      Tenderness: There is no abdominal tenderness.   Musculoskeletal:         General: No swelling.   Skin:     General: Skin is warm.   Neurological:      Mental Status: She is alert and oriented to person, place, and time.   Psychiatric:         Mood and Affect: Mood normal.          Labs     Recent Results (from the past 24 hour(s))   Magnesium    Collection Time: 01/13/21  5:55 AM   Result Value Ref Range    Magnesium 2.4 1.7 - 2.4 mg/dL   Phosphorus    Collection Time: 01/13/21  5:55 AM   Result Value Ref Range    Phosphorus 3.0 2.4 - 4.7 mg/dL   Basic Metabolic Panel    Collection Time: 01/13/21  5:55 AM   Result Value Ref Range    Fasting Status      Sodium 135 135 - 145 mmol/L    Potassium 4.1 3.4 - 5.1 mmol/L    Chloride 106 98 - 107 mmol/L    Carbon Dioxide 25 21 - 32 mmol/L    
Anion Gap 8 (L) 10 - 20 mmol/L    Glucose 173 (H) 65 - 99 mg/dL    BUN 21 (H) 6 - 20 mg/dL    Creatinine 1.10 (H) 0.51 - 0.95 mg/dL    Glomerular Filtration Rate 68 (L) >90 mL/min/1.73m2    BUN/ Creatinine Ratio 19 7 - 25    Calcium 8.1 (L) 8.4 - 10.2 mg/dL   CBC with Automated Differential (performable only)    Collection Time: 01/13/21  5:55 AM   Result Value Ref Range    WBC 13.3 (H) 4.2 - 11.0 K/mcL    RBC 3.32 (L) 4.00 - 5.20 mil/mcL    HGB 9.5 (L) 12.0 - 15.5 g/dL    HCT 29.3 (L) 36.0 - 46.5 %    MCV 88.3 78.0 - 100.0 fl    MCH 28.6 26.0 - 34.0 pg    MCHC 32.4 32.0 - 36.5 g/dL    RDW-CV 14.0 11.0 - 15.0 %    RDW-SD 45.2 39.0 - 50.0 fL     (L) 140 - 450 K/mcL    NRBC 0 <=0 /100 WBC    Neutrophil, Percent 78 %    Lymphocytes, Percent 7 %    Mono, Percent 12 %    Eosinophils, Percent 1 %    Basophils, Percent 0 %    Immature Granulocytes 2 %    Absolute Neutrophils 10.3 (H) 1.8 - 7.7 K/mcL    Absolute Lymphocytes 1.0 1.0 - 4.8 K/mcL    Absolute Monocytes 1.6 (H) 0.3 - 0.9 K/mcL    Absolute Eosinophils  0.1 0.0 - 0.5 K/mcL    Absolute Basophils 0.0 0.0 - 0.3 K/mcL    Absolute Immmature Granulocytes 0.3 (H) 0.0 - 0.2 K/mcL   GLUCOSE, BEDSIDE - POINT OF CARE    Collection Time: 01/13/21  7:30 AM   Result Value Ref Range    GLUCOSE, BEDSIDE - POINT OF CARE 188 (H) 70 - 99 mg/dL   GLUCOSE, BEDSIDE - POINT OF CARE    Collection Time: 01/13/21 11:49 AM   Result Value Ref Range    GLUCOSE, BEDSIDE - POINT OF CARE 239 (H) 70 - 99 mg/dL   GLUCOSE, BEDSIDE - POINT OF CARE    Collection Time: 01/13/21  5:26 PM   Result Value Ref Range    GLUCOSE, BEDSIDE - POINT OF CARE 179 (H) 70 - 99 mg/dL   GLUCOSE, BEDSIDE - POINT OF CARE    Collection Time: 01/13/21  9:05 PM   Result Value Ref Range    GLUCOSE, BEDSIDE - POINT OF CARE 224 (H) 70 - 99 mg/dL   GLUCOSE, BEDSIDE - POINT OF CARE    Collection Time: 01/13/21 11:21 PM   Result Value Ref Range    GLUCOSE, BEDSIDE - POINT OF CARE 175 (H) 70 - 99 mg/dL       Current 
Facility-Administered Medications   Medication Dose Route Frequency Provider Last Rate Last Admin   • melatonin tablet 6 mg  6 mg Oral Once Butch Corley DO       • cefTRIAXone (ROCEPHIN) syringe 2,000 mg  2,000 mg Intravenous Daily Feroz Rodriguez MD   2,000 mg at 01/13/21 0813   • insulin lispro protamine-insulin lispro (HumaLOG MIX) (75-25) 100 UNIT/ML injection 5 Units  5 Units Subcutaneous TID AC Travis Mark MD   5 Units at 01/13/21 1735   • famotidine (PEPCID) tablet 20 mg  20 mg Oral 2 times per day Travis Mark MD   20 mg at 01/13/21 2039   • enoxaparin (LOVENOX) injection 40 mg  40 mg Subcutaneous Daily Travis Mark MD   40 mg at 01/13/21 0813   • pneumococcal 23-valent vaccine (PNEUMOVAX 23) injection 0.5 mL  0.5 mL Intramuscular Once Fabián Chin MD       • acetaminophen (TYLENOL) tablet 650 mg  650 mg Oral Q6H PRN Fabián Chin MD   650 mg at 01/14/21 0001   • aspirin chewable 81 mg  81 mg Oral BID Fabián Chin MD   81 mg at 01/13/21 2040   • benzocaine/menthol (DERMOPLAST) 20-0.5 % topical spray 1 spray  1 spray Topical Q1H PRN Fabián Chin MD       • multivitamin & mineral w/folic acid 1 mg-PRENATAL tablet 1 tablet  1 tablet Oral Daily Fabián Chin MD   1 tablet at 01/13/21 0811   • simethicone (MYLICON) tablet 125 mg  125 mg Oral 4x Daily PRN Fabián Chin MD       • sodium chloride (NORMAL SALINE) 0.9 % bolus 500 mL  500 mL Intravenous PRN Fabián Chin MD       • dextrose 50 % injection 25 g  25 g Intravenous PRN Fabián Chin MD       • dextrose 50 % injection 12.5 g  12.5 g Intravenous PRN Fabián Chin MD       • glucagon (GLUCAGEN) injection 1 mg  1 mg Intramuscular PRN Fabián Chin MD       • dextrose (GLUTOSE) 40 % gel 15 g  15 g Oral PRN Fabián Chin MD       • dextrose (GLUTOSE) 40 % gel 30 g  30 g Oral PRN Fabián Chin MD       • insulin lispro (HumaLOG) scheduled dose AND correction dose   Subcutaneous 4x Daily AC & HS Fabián Chin MD   6 Units at 
01/13/21 2124   • morphine injection 2 mg  2 mg Intravenous Q3H PRN Fabián Chin MD   2 mg at 01/12/21 2232       Imaging    US ABDOMEN LIMITED   Final Result       1. Gallbladder wall thickening and edema, nonspecific, but can be seen in the setting of acute or chronic gallbladder disease, intrinsic liver disease, hypoproteinemia, or viral illness.       2. No evidence of cholelithiasis.       3. No biliary dilatation.         Electronically Signed by: JAIME GOODE M.D.    Signed on: 1/10/2021 1:55 PM          CTA CHEST PULMONARY EMBOLISM W CONTRAST   Final Result      1. No evidence of pulmonary embolism. Subsegmental branches not well evaluated secondary to suboptimal opacification.      2. No thoracic aortic aneurysm or dissection.      3. Scattered interlobular septal thickening involving the mid and lower lungs, which may reflect interstitial edema.      4. Scattered subpleural groundglass and linear opacities as well as small peribronchial vascular infiltrates in the upper and lower lungs. This may represent pulmonary edema or an infectious/inflammatory etiology including COVID 19 pneumonia.      5. Findings compatible with acute pancreatitis. No peripancreatic fluid collection or evidence of pancreatic necrosis.      6. Findings compatible with acute pyelonephritis right kidney. Delayed enhancement and excretion of contrast from the right kidney. No hydronephrosis or evidence of renal abscess.      7. Slight nodular morphology of the liver, raising the possibility of cirrhosis. Splenomegaly, which may be secondary to portal hypertension.      8. Nonspecific gallbladder wall thickening, which can be seen in the setting of acute or chronic gallbladder disease, liver disease, hypoproteinemia, or viral illness.      9. Enlarged heterogeneous uterus, likely related to recent gravid state.      10. Small amount of free fluid within the abdomen and pelvis. No free air or bowel obstruction.      11. Other 
JODI
findings as above.      Electronically Signed by: JAIME GOODE M.D.    Signed on: 1/10/2021 9:37 AM          CT ABDOMEN PELVIS W CONTRAST   Final Result      1. No evidence of pulmonary embolism. Subsegmental branches not well evaluated secondary to suboptimal opacification.      2. No thoracic aortic aneurysm or dissection.      3. Scattered interlobular septal thickening involving the mid and lower lungs, which may reflect interstitial edema.      4. Scattered subpleural groundglass and linear opacities as well as small peribronchial vascular infiltrates in the upper and lower lungs. This may represent pulmonary edema or an infectious/inflammatory etiology including COVID 19 pneumonia.      5. Findings compatible with acute pancreatitis. No peripancreatic fluid collection or evidence of pancreatic necrosis.      6. Findings compatible with acute pyelonephritis right kidney. Delayed enhancement and excretion of contrast from the right kidney. No hydronephrosis or evidence of renal abscess.      7. Slight nodular morphology of the liver, raising the possibility of cirrhosis. Splenomegaly, which may be secondary to portal hypertension.      8. Nonspecific gallbladder wall thickening, which can be seen in the setting of acute or chronic gallbladder disease, liver disease, hypoproteinemia, or viral illness.      9. Enlarged heterogeneous uterus, likely related to recent gravid state.      10. Small amount of free fluid within the abdomen and pelvis. No free air or bowel obstruction.      11. Other findings as above.      Electronically Signed by: JAIME GOODE M.D.    Signed on: 1/10/2021 9:37 AM          XR CHEST PA OR AP 1 VIEW   Final Result       1. Mild increased interstitial markings which may reflect interstitial edema.      2. Mild patchy opacity in the right midlung may represent edema or infiltrate.      3. Follow-up suggested.         Electronically Signed by: JAIME GOODE M.D.    Signed on: 1/10/2021 
8:57 AM               Current Facility-Administered Medications   Medication Dose Route Frequency Provider Last Rate Last Admin   • melatonin tablet 6 mg  6 mg Oral Once Butch Corley DO       • cefTRIAXone (ROCEPHIN) syringe 2,000 mg  2,000 mg Intravenous Daily Feroz Rodriguez MD   2,000 mg at 01/13/21 0813   • insulin lispro protamine-insulin lispro (HumaLOG MIX) (75-25) 100 UNIT/ML injection 5 Units  5 Units Subcutaneous TID AC Travis Mark MD   5 Units at 01/13/21 1735   • famotidine (PEPCID) tablet 20 mg  20 mg Oral 2 times per day Travis Mark MD   20 mg at 01/13/21 2039   • enoxaparin (LOVENOX) injection 40 mg  40 mg Subcutaneous Daily Travis Mark MD   40 mg at 01/13/21 0813   • pneumococcal 23-valent vaccine (PNEUMOVAX 23) injection 0.5 mL  0.5 mL Intramuscular Once Fabián Chin MD       • acetaminophen (TYLENOL) tablet 650 mg  650 mg Oral Q6H PRN Fabián Chin MD   650 mg at 01/14/21 0001   • aspirin chewable 81 mg  81 mg Oral BID Fabián Chin MD   81 mg at 01/13/21 2040   • benzocaine/menthol (DERMOPLAST) 20-0.5 % topical spray 1 spray  1 spray Topical Q1H PRN Fabián Chin MD       • multivitamin & mineral w/folic acid 1 mg-PRENATAL tablet 1 tablet  1 tablet Oral Daily Fabián Chin MD   1 tablet at 01/13/21 0811   • simethicone (MYLICON) tablet 125 mg  125 mg Oral 4x Daily PRN Fabián Chin MD       • sodium chloride (NORMAL SALINE) 0.9 % bolus 500 mL  500 mL Intravenous PRN Fabián Chin MD       • dextrose 50 % injection 25 g  25 g Intravenous PRN Fabián Chin MD       • dextrose 50 % injection 12.5 g  12.5 g Intravenous PRN Fabián Chni MD       • glucagon (GLUCAGEN) injection 1 mg  1 mg Intramuscular PRN Fabián Chin MD       • dextrose (GLUTOSE) 40 % gel 15 g  15 g Oral PRN Fabián Chin MD       • dextrose (GLUTOSE) 40 % gel 30 g  30 g Oral PRN Fabián Chin MD       • insulin lispro (HumaLOG) scheduled dose AND correction dose   Subcutaneous 4x Daily AC & HS Fabián 
MD Giuseppe   6 Units at 21   • morphine injection 2 mg  2 mg Intravenous Q3H PRN Fabián Chin MD   2 mg at 21     Assessment and Plan  51 year old female presenting with Hx.  Type 2 diabetes mellitus on 70/30 insulin at home,  recently delivered baby on  due to spontaneous vaginal delivery and discharged on  came to the emergency room yesterday morning with complaining of chest pain, dyspnea, abdominal pain, headache and body aches for 3 days.     Sepsis possible secondary to UTI/right pyonephritis.    Lactic acid normal.    WBC is 20,000, one of the blood cultures growing E. coli.  Urine cultures in process.  Discussed with ID will continue Zosyn.  -->changed to ceftriaxone per ID recs   Patient had epidural and had Jennings catheter placed recent pregnancy probably cause UTI.    Procalcitonin trending down.     Shortness of breath/chest pain: resolved  Doubt CHF.    EKG shows sinus tachycardia.  Echocardiogram showed normal left ventricular size and ejection fraction of 65% no pulmonary hypertension.  CTA chest is negative for PE.  Dr. Mckenzie group is following.    BNP was 6000.  Pt saturating well on room air.      Acute kidney injury: Possible secondary to UTI and sepsis creatinine was normal on .  Monitor creatinine.     Ecoli UTI/acute pyelonephritis:   Ecoli bacteremia  UA was abnormal CT abdomen showed right pyelonephritis.    Urine cultures in process.    One of the blood culture x 1 growing gram-negative,ecoli  Pt on ceftraixone     Acute pancreatitis:   Lipase was 1200 came back to normal.    Denies any abdominal pain.    Ultrasound of the gallbladder showed no cholelithiasis no CBD.    Tolerating diet.     Type 2 diabetes mellitus:   Put on 75/25 insulin 5 units 3 times a day and sliding scale insulin and 4 times daily Accu-Cheks.     Recent Labs   Lab 21  0730 21  1149 21  1726 21  2105 21  2321   GLUCOSE BEDSIDE 188* 239* 179* 224* 175* 
         Recent postpartum:   Patient had normal vaginal delivery 1 week ago.    Patient is .  Patient is nursing and Dr. [facility] following.  Echocardiogram showed normal LV function.     PUD prophylaxis with Pepcid     Hyponatremia: resolved  Possible fluid overload.      Diarrhea -impoving  cdiff neg     DVT Prophylaxis  Lovenox    Disposition:  Pending clinical improvement    Discussed with RN and Dr HArting Subuhi F Humera, DO

## 2024-02-28 NOTE — PROGRESS NOTE ADULT - PROBLEM SELECTOR PLAN 1
Pt found to have elevated Cr at outpt office (>3), baseline ~1.6, was 1.77 on 2/23  - Cr 4.1 on admission, improving s/p ivf and prbc  - suspect toxin and prerenal azotemia  - gentle ivf  - reviewed renal sono  - add sodium bicarb for metabolic acidosis  - added lokelma for hyperkalemia

## 2024-02-28 NOTE — PROGRESS NOTE ADULT - PROBLEM SELECTOR PLAN 2
failued outpatient abx  - cont ceftriaxone - plan for 3d of IV abx and transition to po cefpodoxime at dc  - reviewed w/ ID  - f/u cultures

## 2024-02-28 NOTE — CARE COORDINATION ASSESSMENT. - NSDCPLANSERVICES_GEN_ALL_CORE
This CM met at the bedside with the pt and her daughter Amy. Introduced myself as the CM explained my role, they verbalized understanding. The pt is A&Ox3 and lives in an apartment with her , 1 step to enter, no steps inside. The pt is independent and has no services in the home and ambulates without any devices. The pt's daughter Amy @ [phone] will transport the pt home once medically cleared. No skilled needs anticipated for discharge. PCP is Dr. Alyce Johnson and the pharmacy is [facility] in [place]. CM team to remain available for post acute needs./No Anticipated Discharge Needs

## 2024-02-28 NOTE — PROGRESS NOTE ADULT - TIME BILLING
updated family  assessed pt twice  add lokelma for hyperkalemia  add sodium bicarb for metabolic acidosis  follow up urine culture results  updated family  spoke w/ 3 consultants

## 2024-02-28 NOTE — CONSULT NOTE ADULT - SUBJECTIVE AND OBJECTIVE BOX
[facility], Division of Infectious Diseases  MURIEL Valencia, GLORIA White, JUANY [facility]  [phone]    ARIADNA TAYLOR  88y, Female  [id number]    HPI--  HPI:  Pt is an 87 yo F with PMHx  sideroblastic anemia, breast cancer (s/p left lumpectomy, radiation and tamoxifen, thyrotoxicosis s/p thyroidectomy, HTN, HLD, MDS presents to the ED with weakness and fatigue. Pt states on Feb 12th had increased urinary frequency, went to  and was prescribed 7 day course of Bactrim for UTI. Initially pt had relief for 1 week post abx course, however began feeling fatigued and returned to the UC who started her on Macrobid. She felt extremely nauseous with the medication, so stopped after a few days. Pt felt progressively weak so she went to her primary (NP) who ordered labs, which were significant for leukocytosis, elevated Cr Pts primary sent pt in for further evaluation and r/o sepsis.    Denies fever, chills, cough, abdominal pain, vomiting, diarrhea, constipation, dysuria      ED Course:   Vitals: BP: 135/65, HR: 86, Temp: 97.8, RR: 16 , SpO2: 98% on RA   Labs: WBC 8.16, Hg 7.6, K 5.7, BUN/Cr 65/4.1, lactate .6, procal 2.71  UA: Moderate LEC, 20 WBC, few bacteria, + squamous epi  Bladder scan: 50cc residual  CXR: < from: Xray Chest 1 View-PORTABLE IMMEDIATE (02.27.24 @ 16:28) >  1. Linear scarring in the left upper lobe is unchanged from the prior   exam.  2. No evidence of pneumonia, pleural effusion or CHF.  3. Surgical clips medial to the left apex, unchanged.     Received in the ED: LR bolus 1000cc x1   (27 Feb 2024 18:07)    ID c/s for UTI  Pt reporting no urinary sx however did mention incomplete emptying of the bladder    Active Medications--  acetaminophen     Tablet .. 650 milliGRAM(s) Oral every 6 hours PRN  aluminum hydroxide/magnesium hydroxide/simethicone Suspension 30 milliLiter(s) Oral every 4 hours PRN  cefTRIAXone   IVPB 1000 milliGRAM(s) IV Intermittent every 24 hours  cholecalciferol 2000 Unit(s) Oral daily  clotrimazole 2% Vaginal Cream 1 Applicatorful Vaginal daily  heparin   Injectable 5000 Unit(s) SubCutaneous every 12 hours  lactated ringers. 1000 milliLiter(s) IV Continuous <Continuous>  levothyroxine 75 MICROGram(s) Oral daily  melatonin 3 milliGRAM(s) Oral at bedtime PRN  melatonin 3 milliGRAM(s) Oral once  ondansetron Injectable 4 milliGRAM(s) IV Push every 8 hours PRN  senna 2 Tablet(s) Oral at bedtime  simvastatin 20 milliGRAM(s) Oral at bedtime  sodium bicarbonate 650 milliGRAM(s) Oral three times a day  sodium zirconium cyclosilicate 10 Gram(s) Oral two times a day    Antimicrobials:   cefTRIAXone   IVPB 1000 milliGRAM(s) IV Intermittent every 24 hours    Immunologic:     ROS:  CONSTITUTIONAL: No fevers or chills. No weakness or headache. No weight changes.  EYES/ENT: No visual or hearing changes. No sore throat or throat pain .  NECK: No pain or stiffness  RESPIRATORY: No cough, wheezing, or hemoptysis. No shortness of breath  CARDIOVASCULAR: No chest pain or palpitations  GASTROINTESTINAL: No abdominal pain. No nausea or vomiting. No diarrhea or constipation.  GENITOURINARY: No dysuria, frequency or hematuria  NEUROLOGICAL: No numbness or weakness  SKIN: No itching or rashes  PSYCHIATRIC: Pleasant. Appropriate affect    Allergies: penicillin (Unknown)  codeine (Other)    PMH -- Breast Cancer    Hyperlipidemia    Anemia    MDS (myelodysplastic syndrome)    H/O hyperthyroidism    Vertigo    Hypercholesterolemia    Gout    HTN (hypertension)    History of blood transfusion reaction    History of blood transfusion    Iron excess    H/O thyrotoxicosis      PSH -- S/P Breast Lumpectomy    S/P Breast Lumpectomy    H/O thyroidectomy      FH -- FH: breast cancer (Mother)    FH: colon cancer (Sibling)    Family history of early CAD    FH: coronary artery disease      Social History --  EtOH: denies   Tobacco: denies   Drug Use: denies     Travel/Environmental/Occupational History:    Physical Exam--  Vital Signs Last 24 Hrs  T(F): 97.7 (28 Feb 2024 04:45), Max: 97.8 (27 Feb 2024 14:51)  HR: 83 (28 Feb 2024 04:45) (82 - 86)  BP: 145/72 (28 Feb 2024 04:45) (128/72 - 145/72)  RR: 18 (28 Feb 2024 04:45) (16 - 18)  SpO2: 92% (28 Feb 2024 04:45) (92% - 96%)  General: nontoxic-appearing, no acute distress  HEENT: NC/AT, EOMI  Lungs: symmetric chest rise  Heart: RRR  Abdomen: Soft.   Extremities: Mild b/l LE edema  Skin: Warm. Dry.     Laboratory & Imaging Data:  CBC:                       8.1    6.26  )-----------( 245      ( 28 Feb 2024 07:47 )             25.5     CMP: 02-28    145  |  120<H>  |  60<H>  ----------------------------<  91  5.6<H>   |  18<L>  |  3.30<H>    Ca    8.9      28 Feb 2024 07:47  Phos  3.7     02-28  Mg     2.2     02-28    TPro  6.1  /  Alb  3.0<L>  /  TBili  1.1  /  DBili  x   /  AST  10<L>  /  ALT  14  /  AlkPhos  47  02-28    LIVER FUNCTIONS - ( 28 Feb 2024 07:47 )  Alb: 3.0 g/dL / Pro: 6.1 g/dL / ALK PHOS: 47 U/L / ALT: 14 U/L / AST: 10 U/L / GGT: x           Urinalysis Basic - ( 28 Feb 2024 07:47 )    Color: x / Appearance: x / SG: x / pH: x  Gluc: 91 mg/dL / Ketone: x  / Bili: x / Urobili: x   Blood: x / Protein: x / Nitrite: x   Leuk Esterase: x / RBC: x / WBC x   Sq Epi: x / Non Sq Epi: x / Bacteria: x        Microbiology: reviewed        Radiology: reviewed

## 2024-02-28 NOTE — CARE COORDINATION ASSESSMENT. - NSCAREPROVIDERS_GEN_ALL_CORE_FT
CARE PROVIDERS:  Accepting Physician: Eder White  Administration: Brad Long  Administration: Dona Allen  Administration: Jesica Guaman  Administration: Paul Funk  Administration: Cuba Calderón  Administration: Buddy Man  Administration: Ernestina Beck  Admitting: Eder White  Attending: Eder White  Case Management: Elly Faust  Consultant: Raji Gonzales  Consultant: Keiko Mooney  Covering Team: Cuba Weeks  Covering Team: Malachi Brown  ED Attending: Mukesh Cobb  ED Nurse: Maggie Gold  Nurse: Debbi Chapin  Ordered: Doctor, Unknown  Outpatient Provider: Donn Vick  Outpatient Provider: Addison Chapin  Override: Kendal Ramos  Override: Anselmo Rees  Primary Team: Eder White  Primary Team: Juanita Segal  Registered Dietitian: Ninfa Small  Respiratory Therapy: Estefania Corbin  Team: [facility], Team  UR// Supp. Assoc.: Aruna Donovan

## 2024-02-28 NOTE — CARE COORDINATION ASSESSMENT. - NSPASTMEDSURGHISTORY_GEN_ALL_CORE_FT
PAST MEDICAL & SURGICAL HISTORY:  Hyperlipidemia      Breast Cancer  Left Breast  tx surgery , rt and tamoxifen      S/P Breast Lumpectomy  left 2009      MDS (myelodysplastic syndrome)      Anemia      Gout  2018      Vertigo  last summer 2018      H/O hyperthyroidism      H/O thyrotoxicosis      Iron excess      History of blood transfusion  6/2019, "i may have hemachromatosis"      HTN (hypertension)      H/O thyroidectomy  left May 2019

## 2024-02-28 NOTE — CONSULT NOTE ADULT - SUBJECTIVE AND OBJECTIVE BOX
Patient is a 88y old  Female who presents with a chief complaint of JODI (27 Feb 2024 18:07)       HPI:  Pt is an 89 yo F with PMHx  sideroblastic anemia, breast cancer (s/p left lumpectomy, radiation and tamoxifen, thyrotoxicosis s/p thyroidectomy, HTN, HLD, MDS presents to the ED with weakness and fatigue. Pt states on Feb 12th had increased urinary frequency, went to UC and was prescribed 7 day course of Bactrim for UTI. Initially pt had relief for 1 week post abx course, however began feeling fatigued and returned to the UC who started her on Macrobid. She felt extremely nauseous with the medication, so stopped after a few days. Pt felt progressively weak so she went to her primary (NP) who ordered labs, which were significant for leukocytosis, elevated Cr Pts primary sent pt in for further evaluation and r/o sepsis.    Denies fever, chills, cough, abdominal pain, vomiting, diarrhea, constipation, dysuria    Renal consulted for JODI  Chart reviewed  Denies prior kidney problems      ED Course:   Vitals: BP: 135/65, HR: 86, Temp: 97.8, RR: 16 , SpO2: 98% on RA   Labs: WBC 8.16, Hg 7.6, K 5.7, BUN/Cr 65/4.1, lactate .6, procal 2.71  UA: Moderate LEC, 20 WBC, few bacteria, + squamous epi  Bladder scan: 50cc residual  CXR: < from: Xray Chest 1 View-PORTABLE IMMEDIATE (02.27.24 @ 16:28) >  1. Linear scarring in the left upper lobe is unchanged from the prior   exam.  2. No evidence of pneumonia, pleural effusion or CHF.  3. Surgical clips medial to the left apex, unchanged.     Received in the ED: LR bolus 1000cc x1   (27 Feb 2024 18:07)       PAST MEDICAL & SURGICAL HISTORY:  Breast Cancer  Left Breast  tx surgery , rt and tamoxifen      Hyperlipidemia      Anemia      MDS (myelodysplastic syndrome)      H/O hyperthyroidism      Vertigo  last summer 2018      Gout  2018      HTN (hypertension)      History of blood transfusion  6/2019, "i may have hemachromatosis"      Iron excess      H/O thyrotoxicosis      S/P Breast Lumpectomy  left 2009      H/O thyroidectomy  left May 2019           FAMILY HISTORY:  FH: breast cancer (Mother)  mother    FH: colon cancer (Sibling)  brother    FH: coronary artery disease  father, brother    NC    Social History:Non smoker    MEDICATIONS  (STANDING):  cefTRIAXone   IVPB 1000 milliGRAM(s) IV Intermittent every 24 hours  cholecalciferol 2000 Unit(s) Oral daily  clotrimazole 2% Vaginal Cream 1 Applicatorful Vaginal daily  heparin   Injectable 5000 Unit(s) SubCutaneous every 12 hours  lactated ringers. 1000 milliLiter(s) (65 mL/Hr) IV Continuous <Continuous>  levothyroxine 75 MICROGram(s) Oral daily  melatonin 3 milliGRAM(s) Oral once  senna 2 Tablet(s) Oral at bedtime  simvastatin 20 milliGRAM(s) Oral at bedtime    MEDICATIONS  (PRN):  acetaminophen     Tablet .. 650 milliGRAM(s) Oral every 6 hours PRN Temp greater or equal to 38C (100.4F), Mild Pain (1 - 3)  aluminum hydroxide/magnesium hydroxide/simethicone Suspension 30 milliLiter(s) Oral every 4 hours PRN Dyspepsia  melatonin 3 milliGRAM(s) Oral at bedtime PRN Insomnia  ondansetron Injectable 4 milliGRAM(s) IV Push every 8 hours PRN Nausea and/or Vomiting   Meds reviewed    Allergies    penicillin (Unknown)  codeine (Other)    Intolerances         REVIEW OF SYSTEMS:    Review of Systems:   Constitutional: Denies fatigue  HEENT: Denies headaches and dizziness  Respiratory: denies SOB, cough, or wheezing  Cardiovascular: denies CP, palpitations  Gastrointestinal: Denies nausea, denies vomiting, diarrhea, constipation, abdominal pain, or bloody stools  Genitourinary: denies painful urination, increased frequency, urgency, or bloody urine  Skin: denies rashes or itching  Musculoskeletal: denies muscle aches, joint swelling  Neurologic: Denies generalized weakness, denies loss of sensation, numbness, or tingling      Vital Signs Last 24 Hrs  T(C): 36.5 (28 Feb 2024 04:45), Max: 36.6 (27 Feb 2024 14:51)  T(F): 97.7 (28 Feb 2024 04:45), Max: 97.8 (27 Feb 2024 14:51)  HR: 83 (28 Feb 2024 04:45) (82 - 86)  BP: 145/72 (28 Feb 2024 04:45) (128/72 - 145/72)  BP(mean): --  RR: 18 (28 Feb 2024 04:45) (16 - 18)  SpO2: 92% (28 Feb 2024 04:45) (92% - 96%)    Parameters below as of 28 Feb 2024 04:45  Patient On (Oxygen Delivery Method): room air      Daily     Daily     PHYSICAL EXAM:    GENERAL: NAD  HEAD:  Atraumatic, Normocephalic  NERVOUS SYSTEM:  Awake and Alert  CHEST/LUNG: Clear to auscultation bilaterally; No rales, rhonchi, wheezing, or rubs  HEART: Regular rate and rhythm; No murmurs, rubs, or gallops  ABDOMEN: Soft, Nontender, Nondistended; Bowel sounds present  EXTREMITIES:  No Edema        LABS:                        8.1    6.26  )-----------( 245      ( 28 Feb 2024 07:47 )             25.5     02-28    145  |  120<H>  |  60<H>  ----------------------------<  91  5.6<H>   |  18<L>  |  3.30<H>    Ca    8.9      28 Feb 2024 07:47  Phos  3.7     02-28  Mg     2.2     02-28    TPro  6.1  /  Alb  3.0<L>  /  TBili  1.1  /  DBili  x   /  AST  10<L>  /  ALT  14  /  AlkPhos  47  02-28    PT/INR - ( 27 Feb 2024 15:30 )   PT: 11.0 sec;   INR: 0.94 ratio         PTT - ( 27 Feb 2024 15:30 )  PTT:28.6 sec  Urinalysis Basic - ( 28 Feb 2024 07:47 )    Color: x / Appearance: x / SG: x / pH: x  Gluc: 91 mg/dL / Ketone: x  / Bili: x / Urobili: x   Blood: x / Protein: x / Nitrite: x   Leuk Esterase: x / RBC: x / WBC x   Sq Epi: x / Non Sq Epi: x / Bacteria: x      Magnesium: 2.2 mg/dL (02-28 @ 07:47)  Phosphorus: 3.7 mg/dL (02-28 @ 07:47)  Magnesium: 2.3 mg/dL (02-27 @ 23:52)  Phosphorus: 3.5 mg/dL (02-27 @ 23:52)          RADIOLOGY & ADDITIONAL TESTS:

## 2024-02-28 NOTE — PROGRESS NOTE ADULT - PROBLEM SELECTOR PLAN 7
#VTE ppx: can cont heparin  #GI ppx: not indicated  #Diet: Diet, DASH/TLC:   Sodium & Cholesterol Restricted (02-27-24 @ 18:38) [Active]  #Activity: Activity - Ambulate with Assistance:     Time/Priority:  Routine (02-27-24 @ 17:49) [Active]  #ACP: full  #Dispo: further plans pending clinical course - anticipate dc tomorrow - no PT or home care needs identified

## 2024-02-29 ENCOUNTER — TRANSCRIPTION ENCOUNTER (OUTPATIENT)
Age: 89
End: 2024-02-29

## 2024-02-29 VITALS — DIASTOLIC BLOOD PRESSURE: 80 MMHG | SYSTOLIC BLOOD PRESSURE: 170 MMHG

## 2024-02-29 LAB
ANION GAP SERPL CALC-SCNC: 6 MMOL/L — SIGNIFICANT CHANGE UP (ref 5–17)
BUN SERPL-MCNC: 50 MG/DL — HIGH (ref 7–23)
CALCIUM SERPL-MCNC: 9 MG/DL — SIGNIFICANT CHANGE UP (ref 8.5–10.1)
CHLORIDE SERPL-SCNC: 120 MMOL/L — HIGH (ref 96–108)
CO2 SERPL-SCNC: 21 MMOL/L — LOW (ref 22–31)
CREAT ?TM UR-MCNC: 69 MG/DL — SIGNIFICANT CHANGE UP
CREAT SERPL-MCNC: 2.5 MG/DL — HIGH (ref 0.5–1.3)
EGFR: 18 ML/MIN/1.73M2 — LOW
GLUCOSE SERPL-MCNC: 99 MG/DL — SIGNIFICANT CHANGE UP (ref 70–99)
HCT VFR BLD CALC: 24.8 % — LOW (ref 34.5–45)
HGB BLD-MCNC: 7.8 G/DL — LOW (ref 11.5–15.5)
MCHC RBC-ENTMCNC: 29.8 PG — SIGNIFICANT CHANGE UP (ref 27–34)
MCHC RBC-ENTMCNC: 31.5 GM/DL — LOW (ref 32–36)
MCV RBC AUTO: 94.7 FL — SIGNIFICANT CHANGE UP (ref 80–100)
NRBC # BLD: 0 /100 WBCS — SIGNIFICANT CHANGE UP (ref 0–0)
OSMOLALITY UR: 334 MOSM/KG — SIGNIFICANT CHANGE UP (ref 50–1200)
PLATELET # BLD AUTO: 209 K/UL — SIGNIFICANT CHANGE UP (ref 150–400)
POTASSIUM SERPL-MCNC: 4.3 MMOL/L — SIGNIFICANT CHANGE UP (ref 3.5–5.3)
POTASSIUM SERPL-SCNC: 4.3 MMOL/L — SIGNIFICANT CHANGE UP (ref 3.5–5.3)
POTASSIUM UR-SCNC: 21.3 MMOL/L — SIGNIFICANT CHANGE UP
PROT ?TM UR-MCNC: 19 MG/DL — HIGH (ref 0–12)
RBC # BLD: 2.62 M/UL — LOW (ref 3.8–5.2)
RBC # FLD: 25.7 % — HIGH (ref 10.3–14.5)
SODIUM SERPL-SCNC: 147 MMOL/L — HIGH (ref 135–145)
SODIUM UR-SCNC: 50 MMOL/L — SIGNIFICANT CHANGE UP
WBC # BLD: 5.48 K/UL — SIGNIFICANT CHANGE UP (ref 3.8–10.5)
WBC # FLD AUTO: 5.48 K/UL — SIGNIFICANT CHANGE UP (ref 3.8–10.5)

## 2024-02-29 PROCEDURE — 81001 URINALYSIS AUTO W/SCOPE: CPT

## 2024-02-29 PROCEDURE — 85027 COMPLETE CBC AUTOMATED: CPT

## 2024-02-29 PROCEDURE — 84133 ASSAY OF URINE POTASSIUM: CPT

## 2024-02-29 PROCEDURE — 99239 HOSP IP/OBS DSCHRG MGMT >30: CPT

## 2024-02-29 PROCEDURE — 82728 ASSAY OF FERRITIN: CPT

## 2024-02-29 PROCEDURE — 87086 URINE CULTURE/COLONY COUNT: CPT

## 2024-02-29 PROCEDURE — 83935 ASSAY OF URINE OSMOLALITY: CPT

## 2024-02-29 PROCEDURE — 36415 COLL VENOUS BLD VENIPUNCTURE: CPT

## 2024-02-29 PROCEDURE — 83550 IRON BINDING TEST: CPT

## 2024-02-29 PROCEDURE — 36430 TRANSFUSION BLD/BLD COMPNT: CPT

## 2024-02-29 PROCEDURE — 86900 BLOOD TYPING SEROLOGIC ABO: CPT

## 2024-02-29 PROCEDURE — 71045 X-RAY EXAM CHEST 1 VIEW: CPT

## 2024-02-29 PROCEDURE — 84145 PROCALCITONIN (PCT): CPT

## 2024-02-29 PROCEDURE — 85730 THROMBOPLASTIN TIME PARTIAL: CPT

## 2024-02-29 PROCEDURE — 82570 ASSAY OF URINE CREATININE: CPT

## 2024-02-29 PROCEDURE — 86901 BLOOD TYPING SEROLOGIC RH(D): CPT

## 2024-02-29 PROCEDURE — 84100 ASSAY OF PHOSPHORUS: CPT

## 2024-02-29 PROCEDURE — 83540 ASSAY OF IRON: CPT

## 2024-02-29 PROCEDURE — 83735 ASSAY OF MAGNESIUM: CPT

## 2024-02-29 PROCEDURE — 87637 SARSCOV2&INF A&B&RSV AMP PRB: CPT

## 2024-02-29 PROCEDURE — 85610 PROTHROMBIN TIME: CPT

## 2024-02-29 PROCEDURE — 83605 ASSAY OF LACTIC ACID: CPT

## 2024-02-29 PROCEDURE — 86140 C-REACTIVE PROTEIN: CPT

## 2024-02-29 PROCEDURE — 84300 ASSAY OF URINE SODIUM: CPT

## 2024-02-29 PROCEDURE — 86850 RBC ANTIBODY SCREEN: CPT

## 2024-02-29 PROCEDURE — 86923 COMPATIBILITY TEST ELECTRIC: CPT

## 2024-02-29 PROCEDURE — 93005 ELECTROCARDIOGRAM TRACING: CPT

## 2024-02-29 PROCEDURE — 99285 EMERGENCY DEPT VISIT HI MDM: CPT | Mod: 25

## 2024-02-29 PROCEDURE — 87040 BLOOD CULTURE FOR BACTERIA: CPT

## 2024-02-29 PROCEDURE — P9016: CPT

## 2024-02-29 PROCEDURE — 80053 COMPREHEN METABOLIC PANEL: CPT

## 2024-02-29 PROCEDURE — 76770 US EXAM ABDO BACK WALL COMP: CPT

## 2024-02-29 PROCEDURE — 84156 ASSAY OF PROTEIN URINE: CPT

## 2024-02-29 PROCEDURE — 80048 BASIC METABOLIC PNL TOTAL CA: CPT

## 2024-02-29 PROCEDURE — 85025 COMPLETE CBC W/AUTO DIFF WBC: CPT

## 2024-02-29 RX ORDER — CHOLECALCIFEROL (VITAMIN D3) 125 MCG
2000 CAPSULE ORAL
Qty: 0 | Refills: 0 | DISCHARGE
Start: 2024-02-29

## 2024-02-29 RX ORDER — AMLODIPINE BESYLATE 2.5 MG/1
2.5 TABLET ORAL DAILY
Refills: 0 | Status: DISCONTINUED | OUTPATIENT
Start: 2024-02-29 | End: 2024-02-29

## 2024-02-29 RX ORDER — SODIUM BICARBONATE 1 MEQ/ML
1 SYRINGE (ML) INTRAVENOUS
Qty: 6 | Refills: 0
Start: 2024-02-29 | End: 2024-03-02

## 2024-02-29 RX ORDER — CEFPODOXIME PROXETIL 100 MG
1 TABLET ORAL
Qty: 2 | Refills: 0
Start: 2024-02-29 | End: 2024-02-29

## 2024-02-29 RX ORDER — AMLODIPINE BESYLATE 2.5 MG/1
1 TABLET ORAL
Qty: 0 | Refills: 0 | DISCHARGE
Start: 2024-02-29

## 2024-02-29 RX ORDER — SENNA PLUS 8.6 MG/1
2 TABLET ORAL
Qty: 0 | Refills: 0 | DISCHARGE
Start: 2024-02-29

## 2024-02-29 RX ADMIN — Medication 650 MILLIGRAM(S): at 16:56

## 2024-02-29 RX ADMIN — SODIUM ZIRCONIUM CYCLOSILICATE 10 GRAM(S): 10 POWDER, FOR SUSPENSION ORAL at 05:27

## 2024-02-29 RX ADMIN — AMLODIPINE BESYLATE 2.5 MILLIGRAM(S): 2.5 TABLET ORAL at 13:06

## 2024-02-29 RX ADMIN — Medication 2000 UNIT(S): at 12:27

## 2024-02-29 RX ADMIN — CEFTRIAXONE 100 MILLIGRAM(S): 500 INJECTION, POWDER, FOR SOLUTION INTRAMUSCULAR; INTRAVENOUS at 09:47

## 2024-02-29 RX ADMIN — HEPARIN SODIUM 5000 UNIT(S): 5000 INJECTION INTRAVENOUS; SUBCUTANEOUS at 05:28

## 2024-02-29 RX ADMIN — Medication 650 MILLIGRAM(S): at 05:28

## 2024-02-29 RX ADMIN — Medication 75 MICROGRAM(S): at 05:28

## 2024-02-29 NOTE — DISCHARGE NOTE PROVIDER - CARE PROVIDERS DIRECT ADDRESSES
[email or web address],DirectAddress_Unknown

## 2024-02-29 NOTE — PROGRESS NOTE ADULT - SUBJECTIVE AND OBJECTIVE BOX
Interval History:  feels better  Chart reviewed and events noted;   Overnight events:    MEDICATIONS  (STANDING):  amLODIPine   Tablet 2.5 milliGRAM(s) Oral daily  cefTRIAXone   IVPB 1000 milliGRAM(s) IV Intermittent every 24 hours  cholecalciferol 2000 Unit(s) Oral daily  clotrimazole 2% Vaginal Cream 1 Applicatorful Vaginal daily  heparin   Injectable 5000 Unit(s) SubCutaneous every 12 hours  levothyroxine 75 MICROGram(s) Oral daily  melatonin 3 milliGRAM(s) Oral once  senna 2 Tablet(s) Oral at bedtime  simvastatin 20 milliGRAM(s) Oral at bedtime  sodium bicarbonate 650 milliGRAM(s) Oral three times a day    MEDICATIONS  (PRN):  acetaminophen     Tablet .. 650 milliGRAM(s) Oral every 6 hours PRN Temp greater or equal to 38C (100.4F), Mild Pain (1 - 3)  aluminum hydroxide/magnesium hydroxide/simethicone Suspension 30 milliLiter(s) Oral every 4 hours PRN Dyspepsia  melatonin 3 milliGRAM(s) Oral at bedtime PRN Insomnia  ondansetron Injectable 4 milliGRAM(s) IV Push every 8 hours PRN Nausea and/or Vomiting      Vital Signs Last 24 Hrs  T(C): 36.4 (29 Feb 2024 12:45), Max: 36.7 (29 Feb 2024 12:14)  T(F): 97.6 (29 Feb 2024 12:45), Max: 98 (29 Feb 2024 12:14)  HR: 63 (29 Feb 2024 12:45) (63 - 85)  BP: 170/64 (29 Feb 2024 12:45) (155/70 - 176/70)  BP(mean): --  RR: 18 (29 Feb 2024 12:45) (18 - 18)  SpO2: 97% (29 Feb 2024 12:45) (92% - 97%)    Parameters below as of 29 Feb 2024 12:45  Patient On (Oxygen Delivery Method): room air        PHYSICAL EXAM  General: adult in NAD  HEENT: clear oropharynx, anicteric sclera, pink conjunctivae  Neck: supple  CV: normal S1S2 with no murmur rubs or gallops  Lungs: clear to auscultation, no wheezes, no rhales  Abdomen: soft non-tender non-distended, no hepato/splenomegaly  Ext: no clubbing cyanosis or edema  Skin: no rashes and no petichiae  Neuro: alert and oriented X3 no focal deficits      LABS:  CBC Full  -  ( 29 Feb 2024 07:40 )  WBC Count : 5.48 K/uL  RBC Count : 2.62 M/uL  Hemoglobin : 7.8 g/dL  Hematocrit : 24.8 %  Platelet Count - Automated : 209 K/uL  Mean Cell Volume : 94.7 fl  Mean Cell Hemoglobin : 29.8 pg  Mean Cell Hemoglobin Concentration : 31.5 gm/dL  Auto Neutrophil # : x  Auto Lymphocyte # : x  Auto Monocyte # : x  Auto Eosinophil # : x  Auto Basophil # : x  Auto Neutrophil % : x  Auto Lymphocyte % : x  Auto Monocyte % : x  Auto Eosinophil % : x  Auto Basophil % : x    02-29    147<H>  |  120<H>  |  50<H>  ----------------------------<  99  4.3   |  21<L>  |  2.50<H>    Ca    9.0      29 Feb 2024 07:40  Phos  3.7     02-28  Mg     2.2     02-28    TPro  6.1  /  Alb  3.0<L>  /  TBili  1.1  /  DBili  x   /  AST  10<L>  /  ALT  14  /  AlkPhos  47  02-28    PT/INR - ( 27 Feb 2024 15:30 )   PT: 11.0 sec;   INR: 0.94 ratio         PTT - ( 27 Feb 2024 15:30 )  PTT:28.6 sec    fe studies  Iron - Total Binding Capacity.: 172 ug/dL (02-28 @ 07:47)  Ferritin: 974 ng/mL (02-28 @ 07:47)      WBC trend  5.48 K/uL (02-29-24 @ 07:40)  6.26 K/uL (02-28-24 @ 07:47)  6.22 K/uL (02-27-24 @ 23:52)  8.16 K/uL (02-27-24 @ 15:30)      Hgb trend  7.8 g/dL (02-29-24 @ 07:40)  8.1 g/dL (02-28-24 @ 07:47)  6.6 g/dL (02-27-24 @ 23:52)  7.6 g/dL (02-27-24 @ 15:30)      plt trend  209 K/uL (02-29-24 @ 07:40)  245 K/uL (02-28-24 @ 07:47)  217 K/uL (02-27-24 @ 23:52)  203 K/uL (02-27-24 @ 15:30)        RADIOLOGY & ADDITIONAL STUDIES:

## 2024-02-29 NOTE — DISCHARGE NOTE PROVIDER - NSDCMRMEDTOKEN_GEN_ALL_CORE_FT
amLODIPine 2.5 mg oral tablet: 1 tab(s) orally once a day  cefpodoxime 200 mg oral tablet: 1 tab(s) orally 2 times a day  cholecalciferol oral tablet: 2000 unit(s) orally once a day  clotrimazole 1% topical cream: Apply topically to affected area 2 times a day  levothyroxine 75 mcg (0.075 mg) oral tablet: 1 tab(s) orally once a day (in the morning)  senna leaf extract oral tablet: 2 tab(s) orally once a day (at bedtime)  simvastatin 20 mg oral tablet: 1 tab(s) orally once a day (at bedtime)  sodium bicarbonate 650 mg oral tablet: 1 tab(s) orally 2 times a day

## 2024-02-29 NOTE — PROVIDER CONTACT NOTE (OTHER) - SITUATION
Patients blood pressure elevated 170/70, reported to Dr Ethan MD ok with patients discharge at this time, order given to discharge patient, pt is asymptomatic,  continue amlodipine and f/u outpt

## 2024-02-29 NOTE — DISCHARGE NOTE PROVIDER - CARE PROVIDER_API CALL
Alyce Johnson  NP in Primary Care Adult-Gerontology  Phone: [phone]  Fax: [phone]  Established Patient  Follow Up Time: 2 weeks    Coreen VillaPerez  Medical Oncology  [address] [facility], [address]  [place] [address]  Phone: [phone]  Fax: [phone]  Established Patient  Follow Up Time: 1-3 days    Kolby Patel  Nephrology  [address] [place] [address], [facility] [address]  [place] [address]  Phone: [phone]  Fax: [phone]  Established Patient  Follow Up Time: 2 weeks

## 2024-02-29 NOTE — PROGRESS NOTE ADULT - SUBJECTIVE AND OBJECTIVE BOX
[facility], Division of Infectious Diseases  MURIEL Valencia Y. Patel, S. Shah, G. [facility]  [phone]    Name: ARIADNA TAYLOR  Age: 88y  Gender: Female  MRN: [id number]    Interval History:  Patient seen and examined at bedside  No acute overnight events. Afebrile  No complaints  Notes reviewed    Antibiotics:  cefTRIAXone   IVPB 1000 milliGRAM(s) IV Intermittent every 24 hours      Medications:  acetaminophen     Tablet .. 650 milliGRAM(s) Oral every 6 hours PRN  aluminum hydroxide/magnesium hydroxide/simethicone Suspension 30 milliLiter(s) Oral every 4 hours PRN  amLODIPine   Tablet 2.5 milliGRAM(s) Oral daily  cefTRIAXone   IVPB 1000 milliGRAM(s) IV Intermittent every 24 hours  cholecalciferol 2000 Unit(s) Oral daily  clotrimazole 2% Vaginal Cream 1 Applicatorful Vaginal daily  heparin   Injectable 5000 Unit(s) SubCutaneous every 12 hours  levothyroxine 75 MICROGram(s) Oral daily  melatonin 3 milliGRAM(s) Oral at bedtime PRN  melatonin 3 milliGRAM(s) Oral once  ondansetron Injectable 4 milliGRAM(s) IV Push every 8 hours PRN  senna 2 Tablet(s) Oral at bedtime  simvastatin 20 milliGRAM(s) Oral at bedtime  sodium bicarbonate 650 milliGRAM(s) Oral three times a day      Review of Systems:  Review of systems otherwise negative except as previously noted.    Allergies: penicillin (Unknown)  codeine (Other)    For details regarding the patient's past medical history, social history, family history, and other miscellaneous elements, please refer the initial infectious diseases consultation and/or the admitting history and physical examination for this admission.    Objective:  Vitals:   T(C): 36.4 (02-29-24 @ 12:45), Max: 36.7 (02-29-24 @ 12:14)  HR: 63 (02-29-24 @ 12:45) (63 - 85)  BP: 170/64 (02-29-24 @ 12:45) (135/72 - 176/70)  RR: 18 (02-29-24 @ 12:45) (18 - 18)  SpO2: 97% (02-29-24 @ 12:45) (92% - 97%)    Physical Examination:  General: no acute distress  HEENT: NC/AT, EOMI,   Cardio: RRR  Resp: symmetric chest rise  Abd: appears soft  Ext: no edema or cyanosis  Skin: warm, dry      Laboratory Studies:  CBC:                       7.8    5.48  )-----------( 209      ( 29 Feb 2024 07:40 )             24.8     CMP: 02-29    147<H>  |  120<H>  |  50<H>  ----------------------------<  99  4.3   |  21<L>  |  2.50<H>    Ca    9.0      29 Feb 2024 07:40  Phos  3.7     02-28  Mg     2.2     02-28    TPro  6.1  /  Alb  3.0<L>  /  TBili  1.1  /  DBili  x   /  AST  10<L>  /  ALT  14  /  AlkPhos  47  02-28    LIVER FUNCTIONS - ( 28 Feb 2024 07:47 )  Alb: 3.0 g/dL / Pro: 6.1 g/dL / ALK PHOS: 47 U/L / ALT: 14 U/L / AST: 10 U/L / GGT: x           Urinalysis Basic - ( 29 Feb 2024 07:40 )    Color: x / Appearance: x / SG: x / pH: x  Gluc: 99 mg/dL / Ketone: x  / Bili: x / Urobili: x   Blood: x / Protein: x / Nitrite: x   Leuk Esterase: x / RBC: x / WBC x   Sq Epi: x / Non Sq Epi: x / Bacteria: x        Microbiology: reviewed    Culture - Urine (collected 02-27-24 @ 16:22)  Source: Clean Catch Clean Catch (Midstream)  Final Report (02-28-24 @ 22:14):    <10,000 CFU/mL Normal Urogenital Daisy    Culture - Blood (collected 02-27-24 @ 15:35)  Source: .Blood Blood-Peripheral  Preliminary Report (02-28-24 @ 22:01):    No growth at 24 hours    Culture - Blood (collected 02-27-24 @ 15:30)  Source: .Blood Blood-Peripheral  Preliminary Report (02-28-24 @ 22:01):    No growth at 24 hours          Radiology: reviewed

## 2024-02-29 NOTE — DISCHARGE NOTE PROVIDER - PROVIDER TOKENS
PROVIDER:[TOKEN:[[id number]],FOLLOWUP:[2 weeks],ESTABLISHEDPATIENT:[T]],PROVIDER:[TOKEN:[[id number]],FOLLOWUP:[1-3 days],ESTABLISHEDPATIENT:[T]],PROVIDER:[TOKEN:[[id number]],FOLLOWUP:[2 weeks],ESTABLISHEDPATIENT:[T]]

## 2024-02-29 NOTE — PROVIDER CONTACT NOTE (OTHER) - SITUATION
Dr Long aware of patient bp of 170 systolic prior to transfusion. Per MD barajas to start blood transfusion now   he will order amlodipine now.

## 2024-02-29 NOTE — DISCHARGE NOTE PROVIDER - NSDCCPCAREPLAN_GEN_ALL_CORE_FT
PRINCIPAL DISCHARGE DIAGNOSIS  Diagnosis: ARF (acute renal failure)  Assessment and Plan of Treatment: Kidney function continues to improve. Suggest taking sodium bicarb for a few days (can follow prescription). Suggest routinely following up with nephrology to further monitor your kidney function and electrolyte balance. Stay hydrated as we discussed (approximately 36oz minimum free water per day).      SECONDARY DISCHARGE DIAGNOSES  Diagnosis: Acute cystitis  Assessment and Plan of Treatment: Continue very short course of antibiotics. You received a similar antibiotic while admitted and you had no allergic reaction. Can take cefpodoxime 200mg twice on 3/1/24 (AM and PM).    Diagnosis: Acute on chronic anemia  Assessment and Plan of Treatment: You received two units of packed red blood cells. Suggest following up with Dr. Villa for your routinely scheduled appointment tomorrow.     PRINCIPAL DISCHARGE DIAGNOSIS  Diagnosis: ARF (acute renal failure)  Assessment and Plan of Treatment: Kidney function continues to improve. Suggest taking sodium bicarb for a few days (can follow prescription). Suggest routinely following up with nephrology to further monitor your kidney function and electrolyte balance. Stay hydrated as we discussed (approximately 36oz minimum free water per day).  Your blood pressure was low initially but is now elevated. We resumed amlodipine on 2/29 but we suggest monitoring your BP at home. HOLD your BP meds if systolic blood pressure (top number) is less than 130      SECONDARY DISCHARGE DIAGNOSES  Diagnosis: Acute cystitis  Assessment and Plan of Treatment: Continue very short course of antibiotics. You received a similar antibiotic while admitted and you had no allergic reaction. Can take cefpodoxime 200mg twice on 3/1/24 (AM and PM).    Diagnosis: Acute on chronic anemia  Assessment and Plan of Treatment: You received two units of packed red blood cells. Suggest following up with Dr. Villa for your routinely scheduled appointment tomorrow.

## 2024-02-29 NOTE — DISCHARGE NOTE NURSING/CASE MANAGEMENT/SOCIAL WORK - NSTRANSFERBELONGINGSRESP_GEN_A_NUR
Spoke with Kendal regarding her abnormal first trimester screening result which increased her age related risk for Down syndrome from 1 in 696 to a risk of 1 in 155. Discussed the increased free beta hCG level is the primary factor elevating her risk. Nuchal translucency was within normal range (2.4mm/75%) and nasal bone present. Reviewed follow-up testing options including NIPT(Innatal), CVS, amniocentesis and level II ultrasound.  Kendal declined invasive testing and is interested in having her blood drawn for NIPT.  She is scheduled to meet with genetic counseling and have this drawn tomorrow at our [facility].  Discussed TAT 7-10 days for results.  Provided Kendal with my contact information should she have additional questions before her visit tomorrow.      Vika Hays MS, [facility]  Certified Genetic Counselor     yes

## 2024-02-29 NOTE — DISCHARGE NOTE PROVIDER - HOSPITAL COURSE
FROM ADMISSION H+P:   HPI:  Pt is an 87 yo F with PMHx sideroblastic anemia, breast cancer (s/p left lumpectomy, radiation and tamoxifen, thyrotoxicosis s/p thyroidectomy, HTN, HLD, MDS presents to the ED with weakness and fatigue     ---  HOSPITAL COURSE:   Pt admitted with multifactorial weakness and fatigue.  #acute kidney injury in setting of chronic kidney disease suspected to be 2/2 ATN 2/2 bactrim - ivf given, nephrotoxins were avoided, pt was given pRBC for anemia, nephrology was consulted. renal indices improved. at time of dc creatinine down to 2.5 creatinine at time of admission was 4.1 pt will follow up outpatient for monitoring renal indices and medical management  #acute on chronic anemia of suspected chronic kidney disease - pt receiving chronic procrit injections as outpatient and has reliable follow up. she has appt tomorrow 3/1. she was given 2 units pRBC with appropriate response in hgb  #acute cystitis which failed outpatient therapy. given iv ceftriaxone. cultures were sterilized by use of outpatient abx. continue cefpodoxime to complete abbreviated course. no evidence of uncontrolled infectious process at this time. afebrile and asymptomatic with no pyuria. (she had frequency yesterady but not today)  #hyperkalemia - likely related to atn - resolved with lokelma  #metabolic acidosis - improved with use of sodium bicarb. plan for short course of sodium bicarb as outpatient  - stable for dc    ---  CONSULTANTS:   eula  nephro  id  pt - no needs FROM ADMISSION H+P:   HPI:  Pt is an 89 yo F with PMHx sideroblastic anemia, breast cancer (s/p left lumpectomy, radiation and tamoxifen, thyrotoxicosis s/p thyroidectomy, HTN, HLD, MDS presents to the ED with weakness and fatigue     ---  HOSPITAL COURSE:   Pt admitted with multifactorial weakness and fatigue.  #acute kidney injury in setting of chronic kidney disease suspected to be 2/2 ATN 2/2 bactrim - ivf given, nephrotoxins were avoided, pt was given pRBC for anemia, nephrology was consulted. renal indices improved. at time of dc creatinine down to 2.5 creatinine at time of admission was 4.1 pt will follow up outpatient for monitoring renal indices and medical management  #acute on chronic anemia of suspected chronic kidney disease - pt receiving chronic procrit injections as outpatient and has reliable follow up. she has appt tomorrow 3/1. she was given 2 units pRBC with appropriate response in hgb  #acute cystitis which failed outpatient therapy. given iv ceftriaxone. cultures were sterilized by use of outpatient abx. continue cefpodoxime to complete abbreviated course. no evidence of uncontrolled infectious process at this time. afebrile and asymptomatic with no pyuria. (she had frequency yesterady but not today)  #hyperkalemia - likely related to atn - resolved with lokelma  #metabolic acidosis - improved with use of sodium bicarb. plan for short course of sodium bicarb as outpatient  #HTN - bp was elevated on day of dc after receiving ivf. resume amlodipine. hold for sbp<130 as outpt and f/u with pcp.   - stable for dc    ---  CONSULTANTS:   eula  nephro  id  pt - no needs

## 2024-02-29 NOTE — PROGRESS NOTE ADULT - SUBJECTIVE AND OBJECTIVE BOX
Patient is a 88y old  Female who presents with a chief complaint of JODI (27 Feb 2024 18:07)       HPI:  Pt is an 87 yo F with PMHx  sideroblastic anemia, breast cancer (s/p left lumpectomy, radiation and tamoxifen, thyrotoxicosis s/p thyroidectomy, HTN, HLD, MDS presents to the ED with weakness and fatigue. Pt states on Feb 12th had increased urinary frequency, went to UC and was prescribed 7 day course of Bactrim for UTI. Initially pt had relief for 1 week post abx course, however began feeling fatigued and returned to the UC who started her on Macrobid. She felt extremely nauseous with the medication, so stopped after a few days. Pt felt progressively weak so she went to her primary (NP) who ordered labs, which were significant for leukocytosis, elevated Cr Pts primary sent pt in for further evaluation and r/o sepsis.    Denies fever, chills, cough, abdominal pain, vomiting, diarrhea, constipation, dysuria    Renal consulted for JODI  Chart reviewed  Denies prior kidney problems      ED Course:   Vitals: BP: 135/65, HR: 86, Temp: 97.8, RR: 16 , SpO2: 98% on RA   Labs: WBC 8.16, Hg 7.6, K 5.7, BUN/Cr 65/4.1, lactate .6, procal 2.71  UA: Moderate LEC, 20 WBC, few bacteria, + squamous epi  Bladder scan: 50cc residual  CXR: < from: Xray Chest 1 View-PORTABLE IMMEDIATE (02.27.24 @ 16:28) >  1. Linear scarring in the left upper lobe is unchanged from the prior   exam.  2. No evidence of pneumonia, pleural effusion or CHF.  3. Surgical clips medial to the left apex, unchanged.     Received in the ED: LR bolus 1000cc x1   (27 Feb 2024 18:07)       PAST MEDICAL & SURGICAL HISTORY:  Breast Cancer  Left Breast  tx surgery , rt and tamoxifen      Hyperlipidemia      Anemia      MDS (myelodysplastic syndrome)      H/O hyperthyroidism      Vertigo  last summer 2018      Gout  2018      HTN (hypertension)      History of blood transfusion  6/2019, "i may have hemachromatosis"      Iron excess      H/O thyrotoxicosis      S/P Breast Lumpectomy  left 2009      H/O thyroidectomy  left May 2019           FAMILY HISTORY:  FH: breast cancer (Mother)  mother    FH: colon cancer (Sibling)  brother    FH: coronary artery disease  father, brother    [place]    Social History:Non smoker    MEDICATIONS  (STANDING):  cefTRIAXone   IVPB 1000 milliGRAM(s) IV Intermittent every 24 hours  cholecalciferol 2000 Unit(s) Oral daily  clotrimazole 2% Vaginal Cream 1 Applicatorful Vaginal daily  heparin   Injectable 5000 Unit(s) SubCutaneous every 12 hours  lactated ringers. 1000 milliLiter(s) (65 mL/Hr) IV Continuous <Continuous>  levothyroxine 75 MICROGram(s) Oral daily  melatonin 3 milliGRAM(s) Oral once  senna 2 Tablet(s) Oral at bedtime  simvastatin 20 milliGRAM(s) Oral at bedtime    MEDICATIONS  (PRN):  acetaminophen     Tablet .. 650 milliGRAM(s) Oral every 6 hours PRN Temp greater or equal to 38C (100.4F), Mild Pain (1 - 3)  aluminum hydroxide/magnesium hydroxide/simethicone Suspension 30 milliLiter(s) Oral every 4 hours PRN Dyspepsia  melatonin 3 milliGRAM(s) Oral at bedtime PRN Insomnia  ondansetron Injectable 4 milliGRAM(s) IV Push every 8 hours PRN Nausea and/or Vomiting   Meds reviewed    Allergies    penicillin (Unknown)  codeine (Other)    Intolerances         REVIEW OF SYSTEMS:    as above    ICU Vital Signs Last 24 Hrs  T(C): 36.5 (29 Feb 2024 14:01), Max: 36.7 (29 Feb 2024 12:14)  T(F): 97.7 (29 Feb 2024 14:01), Max: 98 (29 Feb 2024 12:14)  HR: 64 (29 Feb 2024 14:01) (63 - 85)  BP: 174/70 (29 Feb 2024 14:01) (155/70 - 178/64)  BP(mean): --  ABP: --  ABP(mean): --  RR: 18 (29 Feb 2024 14:01) (18 - 18)  SpO2: 95% (29 Feb 2024 14:01) (92% - 97%)    O2 Parameters below as of 29 Feb 2024 13:46  Patient On (Oxygen Delivery Method): room air            Daily     Daily     PHYSICAL EXAM:    GENERAL: NAD  HEAD:  Atraumatic, Normocephalic  NERVOUS SYSTEM:  Awake and Alert  CHEST/LUNG: Clear to auscultation bilaterally; No rales, rhonchi, wheezing, or rubs  HEART: Regular rate and rhythm; No murmurs, rubs, or gallops  ABDOMEN: Soft, Nontender, Nondistended; Bowel sounds present  EXTREMITIES:  No Edema        LABS:                                   7.8    5.48  )-----------( 209      ( 29 Feb 2024 07:40 )             24.8     02-29    147<H>  |  120<H>  |  50<H>  ----------------------------<  99  4.3   |  21<L>  |  2.50<H>    Ca    9.0      29 Feb 2024 07:40  Phos  3.7     02-28  Mg     2.2     02-28    TPro  6.1  /  Alb  3.0<L>  /  TBili  1.1  /  DBili  x   /  AST  10<L>  /  ALT  14  /  AlkPhos  47  02-28      Urinalysis Basic - ( 29 Feb 2024 07:40 )    Color: x / Appearance: x / SG: x / pH: x  Gluc: 99 mg/dL / Ketone: x  / Bili: x / Urobili: x   Blood: x / Protein: x / Nitrite: x   Leuk Esterase: x / RBC: x / WBC x   Sq Epi: x / Non Sq Epi: x / Bacteria: x

## 2024-02-29 NOTE — DISCHARGE NOTE NURSING/CASE MANAGEMENT/SOCIAL WORK - PATIENT PORTAL LINK FT
You can access the FollowMyHealth Patient Portal offered by [facility] by registering at the following website: [email or web address]. By joining [employer]’s FollowMyHealth portal, you will also be able to view your health information using other applications (apps) compatible with our system.

## 2024-02-29 NOTE — DISCHARGE NOTE PROVIDER - ATTENDING DISCHARGE PHYSICAL EXAMINATION:
T(C): 36.5 (02-29-24 @ 14:01), Max: 36.7 (02-29-24 @ 12:14)  HR: 64 (02-29-24 @ 14:01) (63 - 85)  BP: 174/70 (02-29-24 @ 14:01) (155/70 - 178/64)  RR: 18 (02-29-24 @ 14:01) (18 - 18)  SpO2: 95% (02-29-24 @ 14:01) (92% - 97%)    Pt reports ambulating to the restroom without difficulty today. Denies lightheadedness, dizziness or chest discomfort. No intolerance to ambulation.     PHYSICAL EXAM:  GENERAL: patient appears well, no acute distress  ENMT: oropharynx clear without erythema, moist mucous membranes  LUNGS: good air entry bilaterally, clear to auscultation, symmetric breath sounds, no rales in bases on deep inspiration  HEART: S1/S2, regular rate and rhythm, no murmurs noted, minimal/trace dependent edema in ankles  GASTROINTESTINAL: abdomen is soft, nontender, nondistended, normoactive bowel sounds, no palpable masses  : no suprapubic fullness or tenderness to palpation  MUSCULOSKELETAL: no clubbing or cyanosis, no obvious deformity  NEUROLOGIC: awake, alert, readily interactive, good muscle tone in 4 extremities

## 2024-02-29 NOTE — CASE MANAGEMENT PROGRESS NOTE - NSCMPROGRESSNOTE_GEN_ALL_CORE
Pt to be cleared medically this afternoon after 1 unit of PRBC. Pt is independent and the pt's daughter will transport her home this afternoon. No skilled needs for discharge.

## 2024-02-29 NOTE — PROGRESS NOTE ADULT - ASSESSMENT
Acute on CKD  Hyperkalemia  Metabolic Acidosis  Anemia      -Unknown baseline cr  -JODI likely prerenal  -Urine indices but not needed  -Renal sono unclear of hydro, but renal indices are improving with correction of anemia and IVF  -DC IVF, encourage PO intake  -PRBC per primary team  -Lokelma 10gm x 2  -PO bicarb TID x 3 days  -Daily chem  -No acute indication for dialysis  -Creatinine improving  -Discussed with her the need for follow up    D/W Primary
Pt is an 87 yo F with PMHx  sideroblastic anemia breast cancer (s/p L lumpectomy, radiation and tamoxifen, thyrotoxicosis s/p thyroidectomy, HTN, HLD, MDS presents to the ED with weakness and fatigue and sent to ED by primary for abnormal labs, admitted for JODI.     Acute Cystitis  JODI  Acute on chronic anemia  - JODI likely in the setting of infection vs. medication induced vs. prerenal 2/2 dehydration/anemia  - UA noted  - all cx NGTD  Recommendations:   Can switch to PO cefpodoxime 200mg daily to complete course tomorrow 3/1  Trend temps/WBC  Trend renal fxn/renally dose medications/avoid nephrotoxins  Trend Hgb; transfusion prn protocol  Supportive care and additional management per primary team    Stable from ID standpoint  D/c planning per primary team  Please call with any questions.   Elaine White M.D.  [facility] Division of Infectious Diseases [phone]  For after 5 P.M. and weekends, please call [phone]    
[ASSESSMENT and  PLAN]  D63.1 Anemia CKD  N18.4 CKD stage IV  D46.Z Other MDS  N17.8 JODI      88-year-old female well-known to practice, followed by Dr. Villa [hematology-oncology].  Patient admitted for JODI likely secondary to recent UTI and concurrent dehydration/volume depletion, possible AE from antibiotics.    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  Treated with adjuvant RT postsurgery, by Dr. Kelsie Weinberg..  Followed by 5 years of tamoxifen.    Subsequently diagnosed with sideroblastic anemia March 2012 treated with Procrit initially.  Subsequently changed to Aranesp December 2018.  Then changed to Retacrit August 2019.  History of elevated ferritin started treatment with Exjade March 2016.  Changed to Jadenu January 2017.    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 Gonzales May 2019.      Patient followed in office weekly for Procrit.  Last seen 02/23/2024.  CBC as follows:  02/23/24 WBC 6.32, Hgb 8.5,       RECOMMENDATIONS    JODI  IV fluids  Encourage p.o. hydration    UTI  Management per medicine  Antibiotics  Follow-up cultures.    Anemia status posttransfusion 1 unit PRBC.  Feeling better.     Follow CBC  Has follow-up appointment Friday, 03/04/24 with Dr. Villa.  If stable from other parameters, no objection from hematology for discharge and follow-up in office.    Patient generally reliable.  Daughter involved with medical care.    to receive 1 unit PRBC prior to discharge  has outpatient followup with Dr. Villa      Discussed with Dr Long.        
Pt is an 89 yo F with PMHx  sideroblastic anemia breast cancer (s/p L lumpectomy, radiation and tamoxifen, thyrotoxicosis s/p thyroidectomy, HTN, HLD, MDS presents to the ED with weakness and fatigue and sent to ED by primary for abnormal labs, admitted for JODI.

## 2024-03-03 LAB
CULTURE RESULTS: SIGNIFICANT CHANGE UP
CULTURE RESULTS: SIGNIFICANT CHANGE UP
SPECIMEN SOURCE: SIGNIFICANT CHANGE UP
SPECIMEN SOURCE: SIGNIFICANT CHANGE UP

## 2024-04-09 ENCOUNTER — APPOINTMENT (OUTPATIENT)
Dept: NEPHROLOGY | Facility: CLINIC | Age: 89
End: 2024-04-09
Payer: MEDICARE

## 2024-04-09 VITALS — DIASTOLIC BLOOD PRESSURE: 60 MMHG | SYSTOLIC BLOOD PRESSURE: 142 MMHG

## 2024-04-09 VITALS
DIASTOLIC BLOOD PRESSURE: 62 MMHG | SYSTOLIC BLOOD PRESSURE: 148 MMHG | BODY MASS INDEX: 24.48 KG/M2 | TEMPERATURE: 98 F | HEART RATE: 98 BPM | HEIGHT: 62 IN | WEIGHT: 133 LBS | OXYGEN SATURATION: 95 %

## 2024-04-09 DIAGNOSIS — D64.9 ANEMIA, UNSPECIFIED: ICD-10-CM

## 2024-04-09 DIAGNOSIS — E78.5 HYPERLIPIDEMIA, UNSPECIFIED: ICD-10-CM

## 2024-04-09 DIAGNOSIS — N18.30 CHRONIC KIDNEY DISEASE, STAGE 3 UNSPECIFIED: ICD-10-CM

## 2024-04-09 DIAGNOSIS — N17.9 ACUTE KIDNEY FAILURE, UNSPECIFIED: ICD-10-CM

## 2024-04-09 PROCEDURE — 99205 OFFICE O/P NEW HI 60 MIN: CPT

## 2024-04-09 RX ORDER — LEVOTHYROXINE SODIUM 0.07 MG/1
75 TABLET ORAL
Refills: 0 | Status: ACTIVE | COMMUNITY

## 2024-04-09 RX ORDER — EPOETIN ALFA-EPBX 20000 [IU]/ML
INJECTION, SOLUTION INTRAVENOUS; SUBCUTANEOUS
Refills: 0 | Status: ACTIVE | COMMUNITY

## 2024-04-09 RX ORDER — LEVOTHYROXINE SODIUM 88 UG/1
88 TABLET ORAL
Refills: 0 | Status: DISCONTINUED | COMMUNITY
End: 2024-04-09

## 2024-04-09 RX ORDER — HYDROXYZINE HYDROCHLORIDE 50 MG/1
TABLET ORAL
Refills: 0 | Status: ACTIVE | COMMUNITY

## 2024-04-09 NOTE — PHYSICAL EXAM
[General Appearance - Alert] : alert [General Appearance - In No Acute Distress] : in no acute distress [Sclera] : the sclera and conjunctiva were normal [Neck Appearance] : the appearance of the neck was normal [] : no respiratory distress [Respiration, Rhythm And Depth] : normal respiratory rhythm and effort [Auscultation Breath Sounds / Voice Sounds] : lungs were clear to auscultation bilaterally [Heart Rate And Rhythm] : heart rate was normal and rhythm regular [Heart Sounds] : normal S1 and S2 [Murmurs] : no murmurs [Edema] : there was no peripheral edema [No CVA Tenderness] : no ~M costovertebral angle tenderness [No Spinal Tenderness] : no spinal tenderness [No Focal Deficits] : no focal deficits [Oriented To Time, Place, And Person] : oriented to person, place, and time

## 2024-04-09 NOTE — RESULTS/DATA
[TextEntry] : 4/5/24 k 5.5 co2 20  bun/cr 35/1.74 ca 9.7 alb 4.3 hgb 8.1  US at [facility]: rt renal simple cyst, mild fullness of rt renal pelvis

## 2024-04-09 NOTE — REASON FOR VISIT
[Consultation] : a consultation visit [Family Member] : family member [FreeTextEntry1] : JODI/CKD/hyperakelami/metabolic acidosis

## 2024-04-09 NOTE — HISTORY OF PRESENT ILLNESS
[FreeTextEntry1] : admitted to [facility] wtih weakness and fatigue with scr of 4.1 baseline around 1.7 with no fu with nephro in past  uti tx with bactrim then with macrobid generalized unwellness and ER eval  tx wtih iv abx ivf and lokelma to tx hyperkalemia and aneudy  sodium bicarbonate used for short term post dc  no le edema  since dc has been taking in inc po ==================================== pmhx/psurg hx  - sideroblastic anemia on weekly luda with dr romi garcia - breast ca 2009 lumpectomy/radiation/tamoxifen  - thyrotoxicosis s/p thyroidectomy  - htn  - hld

## 2024-04-14 NOTE — ED ADULT NURSE NOTE - CHIEF COMPLAINT
Triage Note: mother states \"I took her temperature about 40 minutes ago and it was 106 and it startled me how high it was.\" Mother states temperature was taken with temporal thermometer. Mother reports patient c/o cough.       Motrin @ 11 pm    
The patient is a 88y Female complaining of abnormal lab result.

## 2024-06-04 ENCOUNTER — APPOINTMENT (OUTPATIENT)
Dept: CARDIOLOGY | Facility: CLINIC | Age: 89
End: 2024-06-04
Payer: MEDICARE

## 2024-06-04 VITALS
DIASTOLIC BLOOD PRESSURE: 74 MMHG | BODY MASS INDEX: 24.48 KG/M2 | SYSTOLIC BLOOD PRESSURE: 148 MMHG | HEART RATE: 79 BPM | OXYGEN SATURATION: 97 % | WEIGHT: 133 LBS | HEIGHT: 62 IN

## 2024-06-04 DIAGNOSIS — I10 ESSENTIAL (PRIMARY) HYPERTENSION: ICD-10-CM

## 2024-06-04 DIAGNOSIS — I25.10 ATHEROSCLEROTIC HEART DISEASE OF NATIVE CORONARY ARTERY W/OUT ANGINA PECTORIS: ICD-10-CM

## 2024-06-04 DIAGNOSIS — I35.0 NONRHEUMATIC AORTIC (VALVE) STENOSIS: ICD-10-CM

## 2024-06-04 DIAGNOSIS — I34.0 NONRHEUMATIC MITRAL (VALVE) INSUFFICIENCY: ICD-10-CM

## 2024-06-04 DIAGNOSIS — I25.84 ATHEROSCLEROTIC HEART DISEASE OF NATIVE CORONARY ARTERY W/OUT ANGINA PECTORIS: ICD-10-CM

## 2024-06-04 PROCEDURE — 93000 ELECTROCARDIOGRAM COMPLETE: CPT

## 2024-06-04 PROCEDURE — G2211 COMPLEX E/M VISIT ADD ON: CPT

## 2024-06-04 PROCEDURE — 99215 OFFICE O/P EST HI 40 MIN: CPT

## 2024-06-04 NOTE — PHYSICAL EXAM
[General Appearance - In No Acute Distress] : no acute distress [Normal Conjunctiva] : the conjunctiva exhibited no abnormalities [No Oral Pallor] : no oral pallor [Respiration, Rhythm And Depth] : normal respiratory rhythm and effort [Auscultation Breath Sounds / Voice Sounds] : lungs were clear to auscultation bilaterally [Bowel Sounds] : normal bowel sounds [Abdomen Tenderness] : non-tender [Abnormal Walk] : normal gait [Cyanosis, Localized] : no localized cyanosis [] : no rash [Oriented To Time, Place, And Person] : oriented to person, place, and time [Not Palpable] : not palpable [No Precordial Heave] : no precordial heave was noted [Normal Rate] : normal [Rhythm Regular] : regular [Normal S1] : normal S1 [Normal S2] : normal S2 [No Gallop] : no gallop heard [Carotids] : the murmur was transmitted to the carotid arteries [I] : a grade 1 [2+] : left 2+ [No Abnormalities] : the abdominal aorta was not enlarged and no bruit was heard [No Pitting Edema] : no pitting edema present [FreeTextEntry1] : no significant JVD is appreciated at a 45 angle [Apical Thrill] : no thrill palpable at the apex [Click] : no click [Pericardial Rub] : no pericardial rub [Right Carotid Bruit] : no bruit heard over the right carotid [Left Carotid Bruit] : no bruit heard over the left carotid

## 2024-06-04 NOTE — CARDIOLOGY SUMMARY
[de-identified] : 6/4/24 -sinus rhythm at a rate of 77 bpm.  Nonspecific poor R wave progression in leads V1-V3.  Nonspecific diminished voltage in the limb leads and leads V5-V6.  Nonspecific repolarization abnormalities.

## 2024-06-04 NOTE — DISCUSSION/SUMMARY
[FreeTextEntry1] : Mrs. Jamil has been doing well from a cardiac symptomatic standpoint since her previous visit here on 4/19/2023. Specifically, she does not describe having experienced any signs or symptoms to suggest the development of an anginal syndrome, congestive heart failure, or a hemodynamically-compromising arrhythmia. Her cardiac examination today is remarkable for a soft systolic ejection murmur, in all likelihood representing age-related valvular calcification, and essentially unchanged from her previous visit with me. Her blood pressure reading is satisfactory today. Her electrocardiogram today reveals sinus rhythm with non-specific poor R wave progression in leads V1-V3, nonspecific diminished voltage in leads V5-V6, and nonspecific repolarization abnormalities, essentially unchanged from her previous office tracing, allowing for lead placement variation.  As the patient's blood pressure reading is normal today, I have instructed her to continue on the present dosage of Norvasc for the time being. She was previously being treated with losartan, however, this medication was discontinued by her primary care provider, secondary to the development of hyperkalemia.  She has since been following with her nephrologist regarding hyperkalemia, including periodically receiving therapy with Lokelma.  I have reviewed the findings of the echocardiogram of 4/3/2019, the carotid artery Doppler study of 4/4/2019, and the pharmacological nuclear stress study of 5/2/2019 in detail with the patient and her daughter today.  I am referring her for follow-up echocardiography at this point to reassess cardiac structural integrity, left ventricular function, and valvular morphology and function.  She is going to make arrangements to have this study performed through our office, and I will telephone her to discuss the findings, once the study has been completed.  I have reviewed the findings of the blood test report of 1/23/2023 in detail with the patient today, and I have instructed her to continue Zocor 20 mg daily for the time being.  The patient reports having had follow-up blood testing performed through the office of her internist a few months ago, and I have asked her to have a copy of the report forwarded to my office for my review and records, so that I may assess the efficacy of the present dosage of Zocor in optimizing the lipid profile, as well as to check for any potential adverse effects on the liver.  I have asked the patient to call me if she should have any questions or problems pertaining to these matters, and especially if she should experience any concerning symptoms. I have otherwise asked her to return to the office for follow-up cardiac evaluation in 6 months, provided she remains clinically stable in the interimt. [EKG obtained to assist in diagnosis and management of assessed problem(s)] : EKG obtained to assist in diagnosis and management of assessed problem(s)

## 2024-06-04 NOTE — HISTORY OF PRESENT ILLNESS
[FreeTextEntry1] : Mrs. Abbie Jamil presented to the office today for follow-up cardiac evaluation.  I last evaluated the patient in the office on 4/19/2023.  She was accompanied to her visit here today by her daughter.  The patient is an 88-year-old female with a history of hypertension, a dyslipidemia, mild mitral regurgitation, mild aortic regurgitation, coronary artery calcifications (incidentally discovered on a chest CT scan), mild carotid atherosclerosis, stage IIIa chronic kidney disease, chronic sideroblastic anemia, thyroidectomy (partial 5/2019, total 8/2019 for a thyroid nodule with toxic goiter), a pulmonary nodule, a pancreatic cyst, breast carcinoma, and vertigo.  The patient has been doing well from a cardiac symptomatic standpoint since her previous visit here on 4/19/2023. Specifically, she does not describe having experienced chest discomfort or dyspnea on exertion, in association with her activities. She has not noted orthopnea, paroxysmal nocturnal dyspnea, or lower extremity edema. She has not experienced any episodes of palpitations, presyncope or syncope.  The patient was hospitalized at [facility] from 2/27/2024 through 2/29/2024, having presented with weakness and fatigue.  She was discovered to have JODI on CKD, attributed to ATN related to Bactrim therapy for a urinary tract infection.  Her antibiotic therapy was changed to a cephalosporin.  She was treated with intravenous hydration.  She required transfusion of 2 units of packed red blood cells for anemia.  The patient has been following with her hematologist/oncologist regarding receiving weekly Procrit injections, as well as having high iron stores, although she states that she has not been definitively diagnosed as having hemochromatosis.  As far as risk factors for coronary artery disease are concerned, the patient has a history of hypertension and a dyslipidemia. She denies a history of diabetes. She discontinued cigarette smoking in 1986, having previously smoked an average of 1-1/2 to 2 packs per day for a period of approximately 30 years. She describes having a family history of premature coronary artery disease in her father, stating that he suffered his first "heart attack" while in his 50's (ultimately passing at the age of 60 secondary to stomach carcinoma).  Laboratory studies performed on 1/23/2023 (on Zocor 20 mg daily) revealed cholesterol 122, triglycerides 59, HDL 73, and calculated LDL 59.  The liver chemistries were normal.  The BUN and creatinine were 37 and 1.55, respectively.  The potassium level was 4.9.  The glucose level was 107.  The hemoglobin and hematocrit were 8.9 and 28.4, respectively.  The TSH level was 1.13.  The uric acid level was 9.9.  Past medical/surgical history is significant for left breast carcinoma having been diagnosed in 2009, treated with lumpectomy, followed by a 35 week course of radiation therapy, followed by a 5 year course of tamoxifen, chronic anemia of undetermined etiology, a pulmonary nodule (for which the patient follows regularly with a pulmonologist, including having periodic surveillance CT scanning of the chest performed), a pancreatic cyst (incidentally discovered on a chest CT scan, and deemed to be stable, according to the patient), and vertigo.  Echocardiography performed on 4/3/2019 revealed the left atrium to be moderately dilated.  The remainder of the cardiac chambers were normal in dimension.  Left ventricular wall thickness and wall motion were normal, with an estimated ejection fraction of 60 to 65%.  Mild left ventricular diastolic dysfunction was demonstrated.  Mild aortic stenosis (mean gradient 15 mmHg) was demonstrated.  Mild mitral regurgitation was demonstrated.  Mild pulmonic regurgitation and minimal tricuspid regurgitation were demonstrated, with an estimated right ventricular systolic pressure of 37 mmHg.  These findings represented no significant change when compared with a previous study performed on 5/31/2017.  Carotid artery Doppler testing performed on 4/4/2019 revealed mild plaque formation involving the right bulb and mild to moderate plaque formation involving the proximal portion of the right internal carotid artery.  Moderate plaque formation was noted involving the left bulb and the proximal portion of the left internal carotid artery.  No hemodynamically-significant stenoses were demonstrated.  Pharmacological nuclear stress testing performed on 5/2/2019 was negative for the inducement of cardiac symptoms or significant arrhythmias.  0.5-1 mm ST segment depression was noted over the baseline EKG findings.  The cardiac imaging portion of the study revealed normal left ventricular myocardial perfusion (allowing for artifactual attenuation of activity involving the anterior region secondary to overlying breast tissue which resolves during prone imaging) and normal left ventricular systolic function.  Previous History:  Past medical/surgical history is significant for left breast carcinoma having been diagnosed in 2009, treated with lumpectomy, followed by a 35 week course of radiation therapy, followed by a 5 year course of tamoxifen, chronic anemia of undetermined etiology, a pulmonary nodule (for which the patient follows regularly with a pulmonologist, including having periodic surveillance CT scanning of the chest performed), a pancreatic cyst (incidentally discovered on a chest CT scan, and deemed to be stable, according to the patient), and vertigo.

## 2024-06-04 NOTE — ASSESSMENT
[FreeTextEntry1] : =================================== # JODI in setting of bactim/ macrobid - resolved at baseline sc r - no nsaid - maintain adequate po intake   # hyperkalemia with jodi- - resolving  - low k diet discussed and list given  - fu bw in 2 weeks if persistent elevation, start on sodium bicarbonate    and fu bp   # metabolic acidosis  - fu trend of co2   # anemia with sideroblastic anemia  - as per dr garcia  - weekly luda   fu 6 months, sooner based upon trend of bmp with dr garcia 
Detail Level: Detailed
Size Of Lesion: 1
X Size Of Lesion In Cm (Optional): 1.3

## 2024-06-12 LAB
ANION GAP SERPL CALC-SCNC: 13 MMOL/L
BUN SERPL-MCNC: 35 MG/DL
CALCIUM SERPL-MCNC: 9.6 MG/DL
CHLORIDE SERPL-SCNC: 112 MMOL/L
CO2 SERPL-SCNC: 19 MMOL/L
CREAT SERPL-MCNC: 1.74 MG/DL
EGFR: 28 ML/MIN/1.73M2
GLUCOSE SERPL-MCNC: 91 MG/DL
POTASSIUM SERPL-SCNC: 5.4 MMOL/L
SODIUM SERPL-SCNC: 144 MMOL/L

## 2024-06-12 RX ORDER — SODIUM BICARBONATE 325 MG/1
325 TABLET ORAL
Qty: 90 | Refills: 1 | Status: ACTIVE | COMMUNITY
Start: 2024-06-12 | End: 1900-01-01

## 2024-06-12 RX ORDER — SODIUM ZIRCONIUM CYCLOSILICATE 10 G/10G
10 POWDER, FOR SUSPENSION ORAL
Qty: 36 | Refills: 3 | Status: ACTIVE | COMMUNITY
Start: 2024-06-12 | End: 1900-01-01

## 2024-06-24 ENCOUNTER — APPOINTMENT (OUTPATIENT)
Dept: CARDIOLOGY | Facility: CLINIC | Age: 89
End: 2024-06-24
Payer: MEDICARE

## 2024-06-24 DIAGNOSIS — N18.31 CHRONIC KIDNEY DISEASE, STAGE 3A: ICD-10-CM

## 2024-06-24 DIAGNOSIS — E87.5 HYPERKALEMIA: ICD-10-CM

## 2024-06-24 PROCEDURE — 93306 TTE W/DOPPLER COMPLETE: CPT

## 2024-07-03 ENCOUNTER — APPOINTMENT (OUTPATIENT)
Dept: CARDIOLOGY | Facility: CLINIC | Age: 89
End: 2024-07-03

## 2024-07-09 ENCOUNTER — NON-APPOINTMENT (OUTPATIENT)
Age: 89
End: 2024-07-09

## 2024-09-03 NOTE — PATIENT PROFILE ADULT - DO YOU FEEL THREATENED BY OTHERS?
What Is The Reason For Today's Visit?: Preventative Skin Check
Additional History: Patient denies any known new or changing lesions but would like to be screened for possible concerns.
no

## 2024-09-11 NOTE — ED ADULT NURSE NOTE - NSSUSCREENINGQ3_ED_ALL_ED
Intervention Initiated From:  COR / FIFIU    Roxann intervened regarding:  Rounding (Video assessment)    Nurse Notified:  No    Doctor Notified:  No    Comments: Video rounding complete. Pt asleep in bed. VSS.   No

## 2024-10-09 ENCOUNTER — APPOINTMENT (OUTPATIENT)
Dept: NEPHROLOGY | Facility: CLINIC | Age: 89
End: 2024-10-09
Payer: MEDICARE

## 2024-10-09 VITALS
SYSTOLIC BLOOD PRESSURE: 134 MMHG | HEART RATE: 71 BPM | BODY MASS INDEX: 24.48 KG/M2 | TEMPERATURE: 97.5 F | DIASTOLIC BLOOD PRESSURE: 62 MMHG | WEIGHT: 133 LBS | OXYGEN SATURATION: 97 % | HEIGHT: 62 IN

## 2024-10-09 DIAGNOSIS — N18.31 CHRONIC KIDNEY DISEASE, STAGE 3A: ICD-10-CM

## 2024-10-09 DIAGNOSIS — E87.5 HYPERKALEMIA: ICD-10-CM

## 2024-10-09 DIAGNOSIS — N17.9 ACUTE KIDNEY FAILURE, UNSPECIFIED: ICD-10-CM

## 2024-10-09 PROCEDURE — G2211 COMPLEX E/M VISIT ADD ON: CPT

## 2024-10-09 PROCEDURE — 99214 OFFICE O/P EST MOD 30 MIN: CPT

## 2024-11-23 NOTE — ASU DISCHARGE PLAN (ADULT/PEDIATRIC) - FOR NEXT 24 HOURS DO NOT:
Procedure  Critical Care    Performed by: María Jennings DO  Authorized by: María Jennings DO    Critical care provider statement:     Critical care time (minutes):  35    Critical care time was exclusive of:  Separately billable procedures and treating other patients    Critical care was necessary to treat or prevent imminent or life-threatening deterioration of the following conditions:  Trauma    Critical care was time spent personally by me on the following activities:  Blood draw for specimens, ordering and performing treatments and interventions, ordering and review of laboratory studies, development of treatment plan with patient or surrogate, discussions with consultants, ordering and review of radiographic studies, pulse oximetry, discussions with primary provider, evaluation of patient's response to treatment, re-evaluation of patient's condition, examination of patient, review of old charts and obtaining history from patient or surrogate    Care discussed with: admitting provider                 María Jennings DO  11/22/24 8010    
Statement Selected

## 2025-02-07 ENCOUNTER — RX RENEWAL (OUTPATIENT)
Age: 89
End: 2025-02-07

## 2025-04-08 ENCOUNTER — LABORATORY RESULT (OUTPATIENT)
Age: 89
End: 2025-04-08

## 2025-04-09 ENCOUNTER — APPOINTMENT (OUTPATIENT)
Dept: NEPHROLOGY | Facility: CLINIC | Age: 89
End: 2025-04-09
Payer: MEDICARE

## 2025-04-09 VITALS
WEIGHT: 135 LBS | BODY MASS INDEX: 24.84 KG/M2 | SYSTOLIC BLOOD PRESSURE: 132 MMHG | HEART RATE: 79 BPM | HEIGHT: 62 IN | TEMPERATURE: 97.6 F | OXYGEN SATURATION: 97 % | DIASTOLIC BLOOD PRESSURE: 64 MMHG

## 2025-04-09 DIAGNOSIS — E87.5 HYPERKALEMIA: ICD-10-CM

## 2025-04-09 DIAGNOSIS — N18.31 CHRONIC KIDNEY DISEASE, STAGE 3A: ICD-10-CM

## 2025-04-09 PROCEDURE — G2211 COMPLEX E/M VISIT ADD ON: CPT

## 2025-04-09 PROCEDURE — 99214 OFFICE O/P EST MOD 30 MIN: CPT

## 2025-04-25 NOTE — CHART NOTE - NSCHARTNOTEFT_GEN_A_CORE
I spoke with this patient at phone number: [phone] for post medical discharge follow-up    The patient had an uneventful weekend. She states that her blood pressure is improving slowly. She is tolerating liquids and consuming about 40 ounces of free water per day. She feels well with no complaints verbalized. Patient expressed appreciation for the follow-up phone call.
PT calling in to request refill of traMADol (Ultram) 50 mg tablet.   
Called by RN for critical value hgh 6.6  evaluated patient at bedside  Patient has no complaints, feels well, no CP, SOB, dizzyness, fatigue, denies blood in stool or open wounds  lungs cta BL, normal s1s2  hgh  likely dilutional s/p 2L bolus in Ed and continuos fluids with all other blood markers decreased  discussed with patient and daughter about blood transfusion, patient aware and approves, daughter aware  blood consent signed and in chart  type and screen ordered  1 unit pRBC ordered over 3hr, timed CBC with , morning labs  FOBT ordered

## 2025-05-27 NOTE — H&P PST ADULT - WEIGHT IN LBS
[facility] ENCOUNTER  INTERVENTIONAL PAIN MANAGEMENT FOLLOW UP      CHIEF COMPLAINT:  No chief complaint on file.       PERTINENT CARE TEAM:  PCP: Nishi Unger NP    Chiropractic Care:  Gagan Garcia DC  Neurology:  CLAUDINE Nolasco  Family Med/Sports Med:  Beni Welch MD       SUBJECTIVE:    Roshni Mckinley is a 73 year old female seen today as a follow up for neck pain radiating into the trapezius regions; well relieved with R C3-4, C4-5 RFA (11/05/24)x ***     Patient is accompanied by their  and gives permission to speak freely in their presence.            Pt denies clinically relevant bowel/bladder incontinence, saddle anesthesia, recent fever, infection, or antibiotic use.     PAIN COURSE AND PREVIOUS INTERVENTIONS:  Medications:  lidocaine patches, tizanidine, Tylenol, biofreeze [Effexor]  Failed/prev: voltaren gel/[place] evette  Interventions:    11/05/24 R C3-4, C4-5 RFA with 75% reliefx ***  Adjuvants:  chiropractic care, PT , dry needling    BLOOD THINNING MEDICATIONS:  ASA 81 mg    Pertinent Surgical History:  none    Mental Health/Substance Use History:  TY    Pertinent Comorbidities:  Mitral valve regurgitation  Pure hypercholesterolemia  SVT  Hypertriglyceridemia  IFG  Gastritis  GERD  Hx basal cell carcinoma  Meningioma   Alzheimer's  Osteopenia  DDD  Asthma    MEDICATIONS:    Current Outpatient Medications   Medication Sig Dispense Refill   • dicyclomine (BENTYL) 10 MG capsule Take 1 capsule by mouth 4 times daily as needed (Abdominal Pain). 90 capsule 0   • venlafaxine XR (EFFEXOR XR) 37.5 MG 24 hr capsule Take 3 capsules by mouth daily. 270 capsule 3   • MAGNESIUM OXIDE PO      • atorvastatin (LIPITOR) 40 MG tablet TAKE 1 TABLET NIGHTLY 90 tablet 3   • albuterol (ProAir RespiClick) 108 (90 Base) MCG/ACT inhaler Inhale 2 puffs into the lungs every 4 hours as needed for Shortness of Breath or Wheezing. 3 each 1   • donepezil (ARICEPT) 10 MG tablet TAKE 1 TABLET  NIGHTLY 90 tablet 1   • omeprazole (PrilOSEC) 20 MG capsule TAKE 1 CAPSULE EVERY OTHER DAY 45 capsule 3   • VITAMIN D PO      • alendronate (FOSAMAX) 70 MG tablet Take 1 tablet by mouth every 7 days. 12 tablet 3   • fluticasone-salmeterol 250-50 MCG/ACT inhaler Inhale 1 puff into the lungs in the morning and 1 puff in the evening. 3 each 3   • lidocaine (LIDODERM) 5 % patch Apply 1 patch to affected area for 12 hours then remove patch 12 hours after applying 30 patch 3   • tiZANidine (ZANAFLEX) 2 MG tablet Take 1-2 tabs by mouth up to 3 times daily as needed for muscle spasms and pain. 90 tablet 0   • metoPROLOL succinate (TOPROL-XL) 50 MG 24 hr tablet TAKE 1 TABLET DAILY 90 tablet 3   • fludrocortisone (FLORINEF) 0.1 MG tablet TAKE 1 TABLET DAILY 90 tablet 3   • Multiple Vitamin (MULTIVITAMIN ADULT PO) Take 1 capsule by mouth.     • vitamin E 100 units capsule Take 4 capsules by mouth daily.     • acetaminophen (TYLENOL) 500 MG tablet Take 500 mg by mouth every 6 hours as needed for Pain.     • fluticasone (FLONASE) 50 MCG/ACT nasal spray Spray 1-2 sprays in each nostril daily. 3 Bottle 1   • ASPIRIN CHILD 81 MG PO CHEW 1 TABLET DAILY 0 0     No current facility-administered medications for this visit.       PROBLEM LIST:    Patient Active Problem List   Diagnosis   • Mitral valve regurgitation   • Other congenital infection specific to the  period   • CONGENITAL INFEC NEC/toxoplasmosis eyes   • Mixed hyperlipidemia   • Chorioretinitis, unspecified   • Other specified gastritis without mention of hemorrhage   • Generalized anxiety disorder   • Neurologic cardiac syncope   • History of paroxysmal supraventricular tachycardia   • Hx of basal cell carcinoma   • Meningioma (CMS/HCC)   • Uncomplicated asthma (CMD)   • Seasonal allergic rhinitis   • Gastroesophageal reflux disease   • Osteopenia   • Alzheimer's dementia  (CMD)   • IFG (impaired fasting glucose)   • Hypertriglyceridemia   • Cervical spondylosis    • DDD (degenerative disc disease), lumbar   • Lumbosacral spondylosis without myelopathy       HISTORIES:    ALLERGIES:   Allergen Reactions   • Cns Stimulants [Other] CARDIAC DISTURBANCES     heart rate elevation   • Calcium Channel Blockers CARDIAC DISTURBANCES     Verapamil--heart irregular rate (fast and slow)   • Ephedrine Palpitations     All CNS stimulants - per patient   • Meperidine And Related VOMITING     vomiting   • Prednisone DEPRESSION     Tearful       Past Medical History:   Diagnosis Date   • Alzheimer disease  (CMD)    • Basal cell carcinoma    • BONE & CARTILAGE DIS NOS/osteopenia 01/2015   • Colon polyps 02/19/2025    per colonoscopy   • 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   • Diverticulosis 02/19/2025    per colonoscopy   • GENERALIZED ANXIETY DIS 03/28/2007   • Hemorrhoids 02/19/2025    per colonoscopy   • Meningioma  (CMD) 10/27/2016   • Mitral valve disorders(424.0)     mitral valve prolapse with paroxsymal atrial tachycardia, normal stress test 11/97   • NEUROCARDIOGENIC SYNCOPE 03/28/2007   • Other specified congenital anomaly of skin     solar keratosis removed and a family history of skin cancer   • Other specified gastritis without mention of hemorrhage 03/2006    on EGD by Dr French   • Paroxysmal supraventricular tachycardia (CMD)    • Personal history of COVID-19 11/02/2022   • Pure hypercholesterolemia     started tx 11/03   • Uncomplicated asthma (CMD) 04/05/2017    +ve MCT        Past Surgical History:   Procedure Laterality Date   • Biopsy of breast, incisional  2000    Breast Biopsy, benign   • Breast augmentation with implant  age 32    mammoplasty  (silicone), implants removed many years ago   • Cardiac stress test complete  09/2002    Cardiac Stress Test,stress echo WNL   • Colonoscopy diagnostic  01/20/2004    normal- repeat  2014   • Colonoscopy diagnostic  08/22/2014    Dr. Kye Woods - normal repeat 10 years   •  Colonoscopy remove lesions by snare  02/19/2025    TAx2, SSAx1 Polyps.  Diverticulosis. Hemorrhoids Dr. Staley   • Ct cardiac calcium scoring - cash service  03/03/2020    score 0   • Dexa bone density axial skeleton  10/02, 1/2015    osteopenia   • Dexa bone density axial skeleton  1/06, 6/09    osteopenia but improved compared to 2002   • Dexa bone density axial skeleton  01/11/2017; 5/24/19    Osteopenia   • Esophagogastroduodenoscopy transoral flex w/bx single or mult  03/06/2006    Gastritis. Dr. Staley.   • Removal of intact breast implant  07/1999    ruptured silicone implants, removal by Dr. Mckenzie   • Remove tonsils/adenoids,<13 y/o  childhood    Tonsillectomy w Adenoids   • Tilt table evaluation  08/2006    Positive   • Total abdom hysterectomy  age 32    due to bladder issues, ovaries intact        Social History     Socioeconomic History   • Marital status: /Civil Union     Spouse name: Ricki   • Number of children: 2   • Years of education: Not on file   • Highest education level: Not on file   Occupational History   • Occupation: Guidance counselor     Comment: retired     Employer: [employer]   Tobacco Use   • Smoking status: Never   • Smokeless tobacco: Never   Vaping Use   • Vaping status: never used   Substance and Sexual Activity   • Alcohol use: Yes     Alcohol/week: 1.0 - 2.0 standard drink of alcohol     Types: 1 - 2 Standard drinks or equivalent per week   • Drug use: No   • Sexual activity: Yes     Partners: Male     Birth control/protection: Surgical     Comment: NURA   Other Topics Concern   • Not on file   Social History Narrative    Completed the hepatitis B series.     Social Drivers of Health     Financial Resource Strain: Low Risk  (9/13/2022)    Received from [facility]    Overall Financial Resource Strain (CARDIA)    • Difficulty of Paying Living Expenses: Not hard at all   Food Insecurity: No Food Insecurity (9/13/2022)    Received from [facility]    Hunger  Vital Sign    • Worried About Running Out of Food in the Last Year: Never true    • Ran Out of Food in the Last Year: Never true   Transportation Needs: No Transportation Needs (9/13/2022)    Received from [facility]    PRAPARE - Transportation    • Lack of Transportation (Medical): No    • Lack of Transportation (Non-Medical): No   Physical Activity: Insufficiently Active (9/13/2022)    Received from [facility]    Exercise Vital Sign    • Days of Exercise per Week: 3 days    • Minutes of Exercise per Session: 30 min   Stress: No Stress Concern Present (9/13/2022)    Received from [facility]    Citizen of the [place] of Occupational Health - Occupational Stress Questionnaire    • Feeling of Stress : Only a little   Social Connections: Socially Integrated (9/13/2022)    Received from [facility]    Social Connection and Isolation Panel [NHANES]    • Frequency of Communication with Friends and Family: More than three times a week    • Frequency of Social Gatherings with Friends and Family: Three times a week    • Attends Jainism Services: More than 4 times per year    • Active Member of Clubs or Organizations: Yes    • Attends Club or Organization Meetings: More than 4 times per year    • Marital Status:    Feeling safe in your relationship: Low Risk  (4/18/2025)    Interpersonal Safety    • How often physically hurt: Never    • How often insulted or talked down to: Never    • How often threatened with harm: Never    • How often scream or curse at: Never       I have reviewed the family history and social history as listed in the medical record as obtained by my nursing staff and support staff and agree with their documentation.    I have reviewed the patient's pertinent medical records.    REVIEW OF SYSTEMS:   Reviewed. As per RN (or MA) note and my HPI above.      OBJECTIVE:    PHYSICAL EXAMINATION:   Vitals: Visit Vitals  LMP 10/22/1983        Constitutional:   well-developed, well-nourished female in no  acute distress. Gait is without antalgia, without ataxia.   Head: normocephalic, atraumatic  ENT: Conjunctiva non-injected  Skin: Warm, dry, intact without rash or lesion.  Psych:  The patient's mood is normal, and appropriate for the circumstances.  Cardiovascular: well-perfused extremities, no peripheral edema  Pulmonary:  Non-labored respirations, no stridor noted  Abdomen: Non-distended  Neurologic: Coordination intact, Facial nerves intact.  Musculoskeletal:    MOTOR EXAMINATION:  No abnormal muscle tone, movements, or tremors noted. No muscular atrophy.                LABORATORY DATA:    PLT (K/mcL)   Date Value   04/18/2025 257     INR (no units)   Date Value   06/07/2005 0.9     GFR Estimate, Non  (no units)   Date Value   08/05/2019 82     GFR Estimate,  (no units)   Date Value   08/05/2019 >90     Glomerular Filtration Rate (no units)   Date Value   04/18/2025 79     Hemoglobin A1C (%)   Date Value   09/07/2022 5.7 (H)         IMAGING STUDIES:    MRI CERVICAL 09/23/24  FINDINGS: The alignment is maintained. Body heights are preserved. There is mild loss of disc height at C6-7. The cord is normal.   C2-3: Normal   C3-4: There is severe right-sided facet arthropathy with a small right-sided disc osteophyte complex. There is moderate right foraminal narrowing.   C4-5: Normal   C5-6: There is a left-sided disc osteophyte complex with severe left foraminal narrowing.   C6-7: There is a mild broad-based disc osteophyte complex with mild bilateral foraminal narrowing.   C7-T1: Normal   IMPRESSION:   Mild multilevel degenerative disc disease most pronounced at C3-4 and C5-6.    06/03/24- XR Lumbar Spine 2-3 v // CERVICAL XR 2-3 vws:  FINDINGS: Diffuse osseous demineralization is indicated. Study reveals bony alignment of the lumbar spine. There are 5 nonrib-bearing lumbar-type vertebrae with maintenance of vertebral body heights. Degenerative changes are noted primarily involving  the lower lumbar spine with facet arthropathy indicated at the L4 and L5 levels, minimal anterior marginal osteophytosis at L4. Mild narrowing of the intervertebral disc space at L5-S1 indicative of underlying degenerative disc disease. No subluxation.    IMPRESSION: Degenerative changes of the lumbar spine are demonstrated as above.  Cervical: FINDINGS: Degenerative changes of the cervical spine are redemonstrated with multilevel facet arthropathy prominently from the C3-C4 through the C7-T1 levels. Moderate narrowing of the intervertebral disc spaces at C5-C6 and C6-C7 with associated marginal osteophytosis. Possible trace anterior subluxation of C4 on C5. The cervical vertebral body heights are preserved. The prevertebral soft tissue space is within normal limits.    IMPRESSION: Degenerative changes of the cervical spine are redemonstrated as above.    06/29/21- XR Hip 2 vw Bilateral:  FINDINGS/IMPRESSION:BILATERAL HIPS: The hip joint spaces are well-maintained bilaterally. No fractures. Degenerative changes of the sacroiliac joints and pubic symphysis.      I personally reviewed the images pertinent to this patient's visit.     PDMP Reviewed    ASSESSMENT:    No diagnosis found.         Roshni Mckinley is a 73 year old female               No orders of the defined types were placed in this encounter.        No follow-ups on file.    Instructions provided as documented in the after visit summary.    The above recommendations were provided to the patient. Diagnosis, treatment options, risks, benefits, and alternatives were discussed, and all questions were answered to the patient's satisfaction. The patient expressed understanding of the diagnosis and plan for management and agreed with the plan of care.        Thank you for allowing me to take part in the care of Roshni Mckinley    If you have any questions, please feel free to contact me.    Odalys Perez PA-C  Supervising physician:  Yumiko Hoffmann,  MD [facility]            alternatives were discussed, and all questions were answered to the patient's satisfaction. The patient expressed understanding of the diagnosis and plan for management and agreed with the plan of care.        Thank you for allowing me to take part in the care of Roshni Mckinley    If you have any questions, please feel free to contact me.    Odalys Perez PA-C  Supervising physician:  Yumiko Hoffmann MD  [facility]            [phone]

## 2025-05-28 NOTE — ED PROVIDER NOTE - PROGRESS NOTE DETAILS
[facility] EMERGENCY DEPARTMENT  EMERGENCY DEPARTMENT ENCOUNTER    Patient Name: Shimon Min  MRN: [id number]  YOB: 1984  Provider: Franky Mortensen MD  PCP: Gladys Hernandez MD  Time/Date of evaluation:  5/28/25    History of Presenting Illness     Chief Complaint   Patient presents with    Foot Pain     Pt wheeled into triage with cc R foot pain on the medal side x 3 days. Pt reports hx of gout and has been taking colchicine with no relief.        HISTORY (Narrative):   Shimon Min is a 40 y.o. male presents to the Emergency Department with acute foot pain. Patient reports experiencing gout-like symptoms in his foot for the past 2-3 days, which have not improved with colchicine. Pain started on Friday, initially in the back of the foot, then progressed to the toe area. Patient describes a \"knot\" in the affected area. Pain improved slightly over the past couple of days but worsened again this morning. Reports extreme sensitivity, stating that even wind or air blowing on the affected area causes pain.     Attributes flare-up to dietary indiscretions over Memorial Day weekend, including consuming hot dogs and dark sodas (specifically Pepsi) on Friday and last night. Reports playing football with his children while wearing cleats for the first time in a long time over the past two days, which may have exacerbated the condition.    Has been managing symptoms with colchicine, taking 0.6 mg for three nights in a row. Reports having gout flares about once a year in recent years, less frequent than in the past due to dietary changes. Expresses concern about using prednisone due to previous experiences of rapid heartbeat and anxiety, leading to panic attacks.    Medical History  - Anxiety disorder, currently managed with medication  - Gout, with recurrent flares approximately once per year  - History of panic attacks  - Depression, currently managed with medication    Surgical History  -  to encounter.     Current Outpatient Medications on File Prior to Encounter   Medication Sig Dispense Refill    hydroCHLOROthiazide 12.5 MG tablet Take 1 tablet by mouth daily      escitalopram (LEXAPRO) 5 MG tablet TAKE 1 TABLET BY MOUTH EVERY DAY 30 tablet 1    amLODIPine (NORVASC) 10 MG tablet Take 1 tablet by mouth daily         ALLERGIES:  Allergies   Allergen Reactions    Lisinopril Swelling     Pt has sever swelling of the throat and tongue.    Penicillins Other (See Comments)     Allergic as a child    Venlafaxine Palpitations       SOCIAL DETERMINANTS OF HEALTH:  Social Drivers of Health     Tobacco Use: Low Risk  (5/22/2025)    Patient History     Smoking Tobacco Use: Never     Smokeless Tobacco Use: Never     Passive Exposure: Not on file   Alcohol Use: Not At Risk (5/28/2025)    AUDIT-C     Frequency of Alcohol Consumption: Never     Average Number of Drinks: Patient does not drink     Frequency of Binge Drinking: Never   Financial Resource Strain: Low Risk  (2/20/2024)    Overall Financial Resource Strain (CARDIA)     Difficulty of Paying Living Expenses: Not hard at all   Food Insecurity: No Food Insecurity (2/20/2024)    Hunger Vital Sign     Worried About Running Out of Food in the Last Year: Never true     Ran Out of Food in the Last Year: Never true   Transportation Needs: Unknown (2/20/2024)    PRAPARE - Transportation     Lack of Transportation (Medical): Not on file     Lack of Transportation (Non-Medical): No   Physical Activity: Not on file   Stress: Not on file   Social Connections: Not on file   Intimate Partner Violence: Not on file   Depression: Not at risk (5/20/2025)    Received from [facility]    PHQ-2     Patient Health Questionnaire-2 Score: 0   Housing Stability: Unknown (2/20/2024)    Housing Stability Vital Sign     Unable to Pay for Housing in the Last Year: Not on file     Number of Places Lived in the Last Year: Not on file     Unstable Housing in the Last Year: No  Bladder scan with 50 cc residual.

## 2025-07-04 LAB
ANION GAP SERPL CALC-SCNC: 10 MMOL/L
BUN SERPL-MCNC: 36 MG/DL
CALCIUM SERPL-MCNC: 9.8 MG/DL
CHLORIDE SERPL-SCNC: 111 MMOL/L
CO2 SERPL-SCNC: 22 MMOL/L
CREAT SERPL-MCNC: 1.69 MG/DL
EGFRCR SERPLBLD CKD-EPI 2021: 29 ML/MIN/1.73M2
GLUCOSE SERPL-MCNC: 113 MG/DL
POTASSIUM SERPL-SCNC: 5.6 MMOL/L
SODIUM SERPL-SCNC: 142 MMOL/L

## 2025-08-14 ENCOUNTER — RX RENEWAL (OUTPATIENT)
Age: 89
End: 2025-08-14